# Patient Record
Sex: FEMALE | Race: OTHER | Employment: UNEMPLOYED | URBAN - METROPOLITAN AREA
[De-identification: names, ages, dates, MRNs, and addresses within clinical notes are randomized per-mention and may not be internally consistent; named-entity substitution may affect disease eponyms.]

---

## 2018-12-29 ENCOUNTER — HOSPITAL ENCOUNTER (EMERGENCY)
Age: 33
Discharge: HOME | End: 2018-12-29
Attending: EMERGENCY MEDICINE

## 2018-12-29 VITALS
HEART RATE: 80 BPM | HEIGHT: 61 IN | BODY MASS INDEX: 26.31 KG/M2 | DIASTOLIC BLOOD PRESSURE: 77 MMHG | TEMPERATURE: 98.6 F | SYSTOLIC BLOOD PRESSURE: 120 MMHG | WEIGHT: 139.33 LBS | OXYGEN SATURATION: 100 % | RESPIRATION RATE: 16 BRPM

## 2018-12-29 DIAGNOSIS — O21.9 NAUSEA AND VOMITING IN PREGNANCY: Primary | ICD-10-CM

## 2018-12-29 DIAGNOSIS — N30.90 CYSTITIS: ICD-10-CM

## 2018-12-29 LAB
ANION GAP SERPL CALCULATED.3IONS-SCNC: 11 MMOL/L (ref 3–16)
BACTERIA: ABNORMAL /HPF
BASOPHILS ABSOLUTE: 0 K/UL (ref 0–0.2)
BASOPHILS RELATIVE PERCENT: 0.4 %
BILIRUBIN URINE: NEGATIVE
BLOOD, URINE: ABNORMAL
BUN BLDV-MCNC: 10 MG/DL (ref 7–20)
CALCIUM SERPL-MCNC: 9.5 MG/DL (ref 8.3–10.6)
CHLORIDE BLD-SCNC: 102 MMOL/L (ref 99–110)
CLARITY: ABNORMAL
CO2: 23 MMOL/L (ref 21–32)
COLOR: YELLOW
CREAT SERPL-MCNC: <0.5 MG/DL (ref 0.6–1.1)
EOSINOPHILS ABSOLUTE: 0.1 K/UL (ref 0–0.6)
EOSINOPHILS RELATIVE PERCENT: 0.5 %
EPITHELIAL CELLS, UA: 3 /HPF (ref 0–5)
GFR AFRICAN AMERICAN: >60
GFR NON-AFRICAN AMERICAN: >60
GLUCOSE BLD-MCNC: 78 MG/DL (ref 70–99)
GLUCOSE URINE: NEGATIVE MG/DL
GONADOTROPIN, CHORIONIC (HCG) QUANT: NORMAL MIU/ML
HCT VFR BLD CALC: 42 % (ref 36–48)
HEMOGLOBIN: 14.2 G/DL (ref 12–16)
HYALINE CASTS: 3 /LPF (ref 0–8)
KETONES, URINE: NEGATIVE MG/DL
LEUKOCYTE ESTERASE, URINE: ABNORMAL
LYMPHOCYTES ABSOLUTE: 1.8 K/UL (ref 1–5.1)
LYMPHOCYTES RELATIVE PERCENT: 19.6 %
MCH RBC QN AUTO: 29.7 PG (ref 26–34)
MCHC RBC AUTO-ENTMCNC: 33.9 G/DL (ref 31–36)
MCV RBC AUTO: 87.6 FL (ref 80–100)
MICROSCOPIC EXAMINATION: YES
MONOCYTES ABSOLUTE: 0.7 K/UL (ref 0–1.3)
MONOCYTES RELATIVE PERCENT: 7.3 %
NEUTROPHILS ABSOLUTE: 6.8 K/UL (ref 1.7–7.7)
NEUTROPHILS RELATIVE PERCENT: 72.2 %
NITRITE, URINE: NEGATIVE
PDW BLD-RTO: 13.3 % (ref 12.4–15.4)
PH UA: 6.5
PLATELET # BLD: 277 K/UL (ref 135–450)
PMV BLD AUTO: 7 FL (ref 5–10.5)
POTASSIUM REFLEX MAGNESIUM: 4 MMOL/L (ref 3.5–5.1)
PROTEIN UA: NEGATIVE MG/DL
RBC # BLD: 4.79 M/UL (ref 4–5.2)
RBC UA: 8 /HPF (ref 0–4)
SODIUM BLD-SCNC: 136 MMOL/L (ref 136–145)
SPECIFIC GRAVITY UA: 1.02
URINE TYPE: ABNORMAL
UROBILINOGEN, URINE: 0.2 E.U./DL
WBC # BLD: 9.4 K/UL (ref 4–11)
WBC UA: 24 /HPF (ref 0–5)

## 2018-12-29 PROCEDURE — 87086 URINE CULTURE/COLONY COUNT: CPT

## 2018-12-29 PROCEDURE — 84702 CHORIONIC GONADOTROPIN TEST: CPT

## 2018-12-29 PROCEDURE — 81001 URINALYSIS AUTO W/SCOPE: CPT

## 2018-12-29 PROCEDURE — 85025 COMPLETE CBC W/AUTO DIFF WBC: CPT

## 2018-12-29 PROCEDURE — 99284 EMERGENCY DEPT VISIT MOD MDM: CPT

## 2018-12-29 PROCEDURE — 80048 BASIC METABOLIC PNL TOTAL CA: CPT

## 2018-12-29 RX ORDER — CEPHALEXIN 500 MG/1
500 CAPSULE ORAL 2 TIMES DAILY
Qty: 14 CAPSULE | Refills: 0 | Status: SHIPPED | OUTPATIENT
Start: 2018-12-29 | End: 2019-01-05

## 2018-12-29 RX ORDER — 0.9 % SODIUM CHLORIDE 0.9 %
1000 INTRAVENOUS SOLUTION INTRAVENOUS ONCE
Status: DISCONTINUED | OUTPATIENT
Start: 2018-12-29 | End: 2018-12-29 | Stop reason: HOSPADM

## 2018-12-29 RX ORDER — METOCLOPRAMIDE 10 MG/1
10 TABLET ORAL EVERY 6 HOURS PRN
Qty: 10 TABLET | Refills: 0 | Status: SHIPPED | OUTPATIENT
Start: 2018-12-29 | End: 2019-01-08

## 2018-12-29 RX ORDER — METOCLOPRAMIDE HYDROCHLORIDE 5 MG/ML
10 INJECTION INTRAMUSCULAR; INTRAVENOUS ONCE
Status: DISCONTINUED | OUTPATIENT
Start: 2018-12-29 | End: 2018-12-29 | Stop reason: HOSPADM

## 2018-12-29 ASSESSMENT — PAIN - FUNCTIONAL ASSESSMENT: PAIN_FUNCTIONAL_ASSESSMENT: 0-10

## 2018-12-29 ASSESSMENT — PAIN SCALES - GENERAL: PAINLEVEL_OUTOF10: 0

## 2018-12-30 ASSESSMENT — ENCOUNTER SYMPTOMS
NAUSEA: 1
ABDOMINAL PAIN: 0
VOMITING: 1
DIARRHEA: 0

## 2019-01-01 LAB — URINE CULTURE, ROUTINE: NORMAL

## 2019-01-08 ENCOUNTER — HOSPITAL ENCOUNTER (EMERGENCY)
Age: 34
Discharge: HOME | End: 2019-01-08

## 2019-01-08 VITALS
TEMPERATURE: 98.1 F | SYSTOLIC BLOOD PRESSURE: 116 MMHG | DIASTOLIC BLOOD PRESSURE: 68 MMHG | WEIGHT: 137.79 LBS | BODY MASS INDEX: 27.05 KG/M2 | HEART RATE: 74 BPM | RESPIRATION RATE: 14 BRPM | OXYGEN SATURATION: 100 % | HEIGHT: 60 IN

## 2019-01-08 DIAGNOSIS — O21.9 NAUSEA/VOMITING IN PREGNANCY: Primary | ICD-10-CM

## 2019-01-08 DIAGNOSIS — O23.41 URINARY TRACT INFECTION IN MOTHER DURING FIRST TRIMESTER OF PREGNANCY: ICD-10-CM

## 2019-01-08 LAB
A/G RATIO: 1.1 (ref 1.1–2.2)
ALBUMIN SERPL-MCNC: 4.1 G/DL (ref 3.4–5)
ALP BLD-CCNC: 55 U/L (ref 40–129)
ALT SERPL-CCNC: 16 U/L (ref 10–40)
ANION GAP SERPL CALCULATED.3IONS-SCNC: 15 MMOL/L (ref 3–16)
AST SERPL-CCNC: 20 U/L (ref 15–37)
BACTERIA: ABNORMAL /HPF
BASOPHILS ABSOLUTE: 0.1 K/UL (ref 0–0.2)
BASOPHILS RELATIVE PERCENT: 0.5 %
BILIRUB SERPL-MCNC: 0.6 MG/DL (ref 0–1)
BILIRUBIN URINE: NEGATIVE
BLOOD, URINE: ABNORMAL
BUN BLDV-MCNC: 7 MG/DL (ref 7–20)
CALCIUM SERPL-MCNC: 9.3 MG/DL (ref 8.3–10.6)
CHLORIDE BLD-SCNC: 100 MMOL/L (ref 99–110)
CLARITY: ABNORMAL
CO2: 21 MMOL/L (ref 21–32)
COLOR: YELLOW
CREAT SERPL-MCNC: <0.5 MG/DL (ref 0.6–1.1)
CRYSTALS, UA: ABNORMAL /HPF
EOSINOPHILS ABSOLUTE: 0.1 K/UL (ref 0–0.6)
EOSINOPHILS RELATIVE PERCENT: 1 %
EPITHELIAL CELLS, UA: 3 /HPF (ref 0–5)
GFR AFRICAN AMERICAN: >60
GFR NON-AFRICAN AMERICAN: >60
GLOBULIN: 3.8 G/DL
GLUCOSE BLD-MCNC: 79 MG/DL (ref 70–99)
GLUCOSE URINE: NEGATIVE MG/DL
GONADOTROPIN, CHORIONIC (HCG) QUANT: NORMAL MIU/ML
HCT VFR BLD CALC: 43.5 % (ref 36–48)
HEMOGLOBIN: 14.3 G/DL (ref 12–16)
HYALINE CASTS: 5 /LPF (ref 0–8)
KETONES, URINE: >=80 MG/DL
LEUKOCYTE ESTERASE, URINE: ABNORMAL
LYMPHOCYTES ABSOLUTE: 2.5 K/UL (ref 1–5.1)
LYMPHOCYTES RELATIVE PERCENT: 19.2 %
MCH RBC QN AUTO: 29 PG (ref 26–34)
MCHC RBC AUTO-ENTMCNC: 32.8 G/DL (ref 31–36)
MCV RBC AUTO: 88.2 FL (ref 80–100)
MICROSCOPIC EXAMINATION: YES
MONOCYTES ABSOLUTE: 0.8 K/UL (ref 0–1.3)
MONOCYTES RELATIVE PERCENT: 6.4 %
NEUTROPHILS ABSOLUTE: 9.4 K/UL (ref 1.7–7.7)
NEUTROPHILS RELATIVE PERCENT: 72.9 %
NITRITE, URINE: NEGATIVE
PDW BLD-RTO: 13.5 % (ref 12.4–15.4)
PH UA: 7
PLATELET # BLD: 308 K/UL (ref 135–450)
PLATELET SLIDE REVIEW: ADEQUATE
PMV BLD AUTO: 7.2 FL (ref 5–10.5)
POTASSIUM SERPL-SCNC: 4.2 MMOL/L (ref 3.5–5.1)
PROTEIN UA: NEGATIVE MG/DL
RBC # BLD: 4.93 M/UL (ref 4–5.2)
RBC UA: ABNORMAL /HPF (ref 0–2)
SLIDE REVIEW: ABNORMAL
SODIUM BLD-SCNC: 136 MMOL/L (ref 136–145)
SPECIFIC GRAVITY UA: 1.02
TOTAL PROTEIN: 7.9 G/DL (ref 6.4–8.2)
URINE REFLEX TO CULTURE: YES
URINE TYPE: ABNORMAL
UROBILINOGEN, URINE: 1 E.U./DL
WBC # BLD: 12.9 K/UL (ref 4–11)
WBC UA: 15 /HPF (ref 0–5)

## 2019-01-08 PROCEDURE — 85025 COMPLETE CBC W/AUTO DIFF WBC: CPT

## 2019-01-08 PROCEDURE — 84702 CHORIONIC GONADOTROPIN TEST: CPT

## 2019-01-08 PROCEDURE — 87086 URINE CULTURE/COLONY COUNT: CPT

## 2019-01-08 PROCEDURE — 6360000002 HC RX W HCPCS: Performed by: PHYSICIAN ASSISTANT

## 2019-01-08 PROCEDURE — 96374 THER/PROPH/DIAG INJ IV PUSH: CPT

## 2019-01-08 PROCEDURE — 96375 TX/PRO/DX INJ NEW DRUG ADDON: CPT

## 2019-01-08 PROCEDURE — 96361 HYDRATE IV INFUSION ADD-ON: CPT

## 2019-01-08 PROCEDURE — 80053 COMPREHEN METABOLIC PANEL: CPT

## 2019-01-08 PROCEDURE — 81001 URINALYSIS AUTO W/SCOPE: CPT

## 2019-01-08 PROCEDURE — 99283 EMERGENCY DEPT VISIT LOW MDM: CPT

## 2019-01-08 PROCEDURE — 2580000003 HC RX 258: Performed by: PHYSICIAN ASSISTANT

## 2019-01-08 RX ORDER — DIPHENHYDRAMINE HYDROCHLORIDE 50 MG/ML
12.5 INJECTION INTRAMUSCULAR; INTRAVENOUS ONCE
Status: COMPLETED | OUTPATIENT
Start: 2019-01-08 | End: 2019-01-08

## 2019-01-08 RX ORDER — METOCLOPRAMIDE 10 MG/1
10 TABLET ORAL 3 TIMES DAILY PRN
Qty: 20 TABLET | Refills: 0 | Status: SHIPPED | OUTPATIENT
Start: 2019-01-08 | End: 2019-03-22

## 2019-01-08 RX ORDER — 0.9 % SODIUM CHLORIDE 0.9 %
1000 INTRAVENOUS SOLUTION INTRAVENOUS ONCE
Status: COMPLETED | OUTPATIENT
Start: 2019-01-08 | End: 2019-01-08

## 2019-01-08 RX ORDER — NITROFURANTOIN 25; 75 MG/1; MG/1
100 CAPSULE ORAL 2 TIMES DAILY
Qty: 14 CAPSULE | Refills: 0 | Status: SHIPPED | OUTPATIENT
Start: 2019-01-08 | End: 2019-01-15

## 2019-01-08 RX ORDER — DEXTROSE AND SODIUM CHLORIDE 5; .45 G/100ML; G/100ML
INJECTION, SOLUTION INTRAVENOUS CONTINUOUS
Status: DISCONTINUED | OUTPATIENT
Start: 2019-01-08 | End: 2019-01-08 | Stop reason: HOSPADM

## 2019-01-08 RX ORDER — METOCLOPRAMIDE HYDROCHLORIDE 5 MG/ML
5 INJECTION INTRAMUSCULAR; INTRAVENOUS ONCE
Status: COMPLETED | OUTPATIENT
Start: 2019-01-08 | End: 2019-01-08

## 2019-01-08 RX ADMIN — METOCLOPRAMIDE 5 MG: 5 INJECTION, SOLUTION INTRAMUSCULAR; INTRAVENOUS at 19:13

## 2019-01-08 RX ADMIN — SODIUM CHLORIDE 1000 ML: 9 INJECTION, SOLUTION INTRAVENOUS at 19:13

## 2019-01-08 RX ADMIN — DIPHENHYDRAMINE HYDROCHLORIDE 12.5 MG: 50 INJECTION, SOLUTION INTRAMUSCULAR; INTRAVENOUS at 19:13

## 2019-01-08 RX ADMIN — DEXTROSE AND SODIUM CHLORIDE: 5; 450 INJECTION, SOLUTION INTRAVENOUS at 20:16

## 2019-01-08 ASSESSMENT — PAIN SCALES - GENERAL
PAINLEVEL_OUTOF10: 0

## 2019-01-09 ASSESSMENT — ENCOUNTER SYMPTOMS
NAUSEA: 1
BACK PAIN: 0
ABDOMINAL PAIN: 0
SHORTNESS OF BREATH: 0
VOMITING: 1

## 2019-01-10 LAB — URINE CULTURE, ROUTINE: NORMAL

## 2019-01-22 ENCOUNTER — HOSPITAL ENCOUNTER (OUTPATIENT)
Dept: OBGYN CLINIC | Age: 34
Discharge: HOME | End: 2019-01-22

## 2019-01-22 VITALS
WEIGHT: 141.2 LBS | HEART RATE: 73 BPM | SYSTOLIC BLOOD PRESSURE: 110 MMHG | DIASTOLIC BLOOD PRESSURE: 67 MMHG | BODY MASS INDEX: 27.58 KG/M2

## 2019-01-22 DIAGNOSIS — Z34.91 NORMAL PREGNANCY IN FIRST TRIMESTER: ICD-10-CM

## 2019-01-22 DIAGNOSIS — D21.9 FIBROID: Chronic | ICD-10-CM

## 2019-01-22 DIAGNOSIS — O21.9 NAUSEA AND VOMITING DURING PREGNANCY: ICD-10-CM

## 2019-01-22 LAB
ABO/RH: NORMAL
ANTIBODY SCREEN: NORMAL

## 2019-01-22 PROCEDURE — 86850 RBC ANTIBODY SCREEN: CPT

## 2019-01-22 PROCEDURE — 99213 OFFICE O/P EST LOW 20 MIN: CPT

## 2019-01-22 PROCEDURE — 86701 HIV-1ANTIBODY: CPT

## 2019-01-22 PROCEDURE — 87390 HIV-1 AG IA: CPT

## 2019-01-22 PROCEDURE — 99202 OFFICE O/P NEW SF 15 MIN: CPT | Performed by: OBSTETRICS & GYNECOLOGY

## 2019-01-22 PROCEDURE — 86762 RUBELLA ANTIBODY: CPT

## 2019-01-22 PROCEDURE — 90686 IIV4 VACC NO PRSV 0.5 ML IM: CPT | Performed by: OBSTETRICS & GYNECOLOGY

## 2019-01-22 PROCEDURE — 87491 CHLMYD TRACH DNA AMP PROBE: CPT

## 2019-01-22 PROCEDURE — 86901 BLOOD TYPING SEROLOGIC RH(D): CPT

## 2019-01-22 PROCEDURE — 86702 HIV-2 ANTIBODY: CPT

## 2019-01-22 PROCEDURE — 87086 URINE CULTURE/COLONY COUNT: CPT

## 2019-01-22 PROCEDURE — 80307 DRUG TEST PRSMV CHEM ANLYZR: CPT

## 2019-01-22 PROCEDURE — 6360000002 HC RX W HCPCS: Performed by: OBSTETRICS & GYNECOLOGY

## 2019-01-22 PROCEDURE — 87340 HEPATITIS B SURFACE AG IA: CPT

## 2019-01-22 PROCEDURE — 81003 URINALYSIS AUTO W/O SCOPE: CPT

## 2019-01-22 PROCEDURE — 87591 N.GONORRHOEAE DNA AMP PROB: CPT

## 2019-01-22 PROCEDURE — G0008 ADMIN INFLUENZA VIRUS VAC: HCPCS | Performed by: OBSTETRICS & GYNECOLOGY

## 2019-01-22 PROCEDURE — 36415 COLL VENOUS BLD VENIPUNCTURE: CPT

## 2019-01-22 PROCEDURE — 86780 TREPONEMA PALLIDUM: CPT

## 2019-01-22 PROCEDURE — 85025 COMPLETE CBC W/AUTO DIFF WBC: CPT

## 2019-01-22 PROCEDURE — 86900 BLOOD TYPING SEROLOGIC ABO: CPT

## 2019-01-22 RX ORDER — ONDANSETRON 4 MG/1
4 TABLET, FILM COATED ORAL EVERY 8 HOURS PRN
Qty: 30 TABLET | Refills: 0 | Status: SHIPPED | OUTPATIENT
Start: 2019-01-22 | End: 2019-02-05 | Stop reason: SDUPTHER

## 2019-01-22 RX ADMIN — INFLUENZA A VIRUS A/MICHIGAN/45/2015 X-275 (H1N1) ANTIGEN (FORMALDEHYDE INACTIVATED), INFLUENZA A VIRUS A/SINGAPORE/INFIMH-16-0019/2016 IVR-186 (H3N2) ANTIGEN (FORMALDEHYDE INACTIVATED), INFLUENZA B VIRUS B/PHUKET/3073/2013 ANTIGEN (FORMALDEHYDE INACTIVATED), AND INFLUENZA B VIRUS B/MARYLAND/15/2016 BX-69A ANTIGEN (FORMALDEHYDE INACTIVATED) 0.5 ML: 15; 15; 15; 15 INJECTION, SUSPENSION INTRAMUSCULAR at 15:35

## 2019-01-23 LAB
AMPHETAMINE SCREEN, URINE: NORMAL
BARBITURATE SCREEN URINE: NORMAL
BASOPHILS ABSOLUTE: 0 K/UL (ref 0–0.2)
BASOPHILS RELATIVE PERCENT: 0.4 %
BENZODIAZEPINE SCREEN, URINE: NORMAL
BILIRUBIN URINE: NEGATIVE MG/DL
BLOOD, URINE: ABNORMAL
BUPRENORPHINE URINE: NORMAL
CANNABINOID SCREEN URINE: NORMAL
CLARITY: CLEAR
COCAINE METABOLITE SCREEN URINE: NORMAL
COLOR: YELLOW
EOSINOPHILS ABSOLUTE: 0.1 K/UL (ref 0–0.6)
EOSINOPHILS RELATIVE PERCENT: 1 %
GLUCOSE URINE: NEGATIVE MG/DL
HCT VFR BLD CALC: 41.2 % (ref 36–48)
HEMOGLOBIN: 13.9 G/DL (ref 12–16)
HEPATITIS B SURFACE ANTIGEN INTERPRETATION: ABNORMAL
HIV AG/AB: NORMAL
HIV ANTIGEN: NORMAL
HIV-1 ANTIBODY: NORMAL
HIV-2 AB: NORMAL
KETONES, URINE: NEGATIVE MG/DL
LEUKOCYTE ESTERASE, URINE: ABNORMAL
LYMPHOCYTES ABSOLUTE: 2.2 K/UL (ref 1–5.1)
LYMPHOCYTES RELATIVE PERCENT: 18.3 %
Lab: NORMAL
MCH RBC QN AUTO: 29.7 PG (ref 26–34)
MCHC RBC AUTO-ENTMCNC: 33.7 G/DL (ref 31–36)
MCV RBC AUTO: 88.1 FL (ref 80–100)
METHADONE SCREEN, URINE: NORMAL
MONOCYTES ABSOLUTE: 0.9 K/UL (ref 0–1.3)
MONOCYTES RELATIVE PERCENT: 7.4 %
NEUTROPHILS ABSOLUTE: 8.8 K/UL (ref 1.7–7.7)
NEUTROPHILS RELATIVE PERCENT: 72.9 %
NITRITE, URINE: NEGATIVE
OPIATE SCREEN URINE: NORMAL
OXYCODONE URINE: NORMAL
PDW BLD-RTO: 13.3 % (ref 12.4–15.4)
PH UA: 6
PH UA: 7
PHENCYCLIDINE SCREEN URINE: NORMAL
PLATELET # BLD: 304 K/UL (ref 135–450)
PMV BLD AUTO: 7 FL (ref 5–10.5)
PROPOXYPHENE SCREEN: NORMAL
PROTEIN UA: NEGATIVE MG/DL
RBC # BLD: 4.68 M/UL (ref 4–5.2)
RUBELLA ANTIBODY IGG: 249.8 IU/ML
SPECIFIC GRAVITY UA: 1.02
TOTAL SYPHILLIS IGG/IGM: ABNORMAL
UROBILINOGEN, URINE: 0.2 E.U./DL
WBC # BLD: 12.1 K/UL (ref 4–11)

## 2019-01-24 LAB
C. TRACHOMATIS DNA ,URINE: NEGATIVE
N. GONORRHOEAE DNA, URINE: NEGATIVE
URINE CULTURE, ROUTINE: NORMAL

## 2019-02-05 ENCOUNTER — TELEPHONE (OUTPATIENT)
Dept: OBGYN CLINIC | Age: 34
End: 2019-02-05

## 2019-02-05 RX ORDER — ONDANSETRON 4 MG/1
4 TABLET, FILM COATED ORAL EVERY 8 HOURS PRN
Qty: 30 TABLET | Refills: 0 | Status: SHIPPED | OUTPATIENT
Start: 2019-02-05 | End: 2019-03-14 | Stop reason: SDUPTHER

## 2019-02-22 ENCOUNTER — HOSPITAL ENCOUNTER (OUTPATIENT)
Dept: OBGYN CLINIC | Age: 34
Discharge: HOME | End: 2019-02-22

## 2019-02-22 VITALS
DIASTOLIC BLOOD PRESSURE: 67 MMHG | BODY MASS INDEX: 28.36 KG/M2 | HEART RATE: 73 BPM | WEIGHT: 145.2 LBS | SYSTOLIC BLOOD PRESSURE: 113 MMHG

## 2019-02-22 DIAGNOSIS — Z34.91 NORMAL PREGNANCY IN FIRST TRIMESTER: Primary | ICD-10-CM

## 2019-02-22 LAB
BILIRUBIN URINE: NEGATIVE MG/DL
BLOOD, URINE: ABNORMAL
CLARITY: CLEAR
COLOR: YELLOW
GLUCOSE URINE: NEGATIVE MG/DL
KETONES, URINE: NEGATIVE MG/DL
LEUKOCYTE ESTERASE, URINE: ABNORMAL
NITRITE, URINE: NEGATIVE
PH UA: 7.5
PROTEIN UA: NEGATIVE MG/DL
SPECIFIC GRAVITY UA: 1.01
UROBILINOGEN, URINE: 0.2 E.U./DL

## 2019-02-22 PROCEDURE — 99212 OFFICE O/P EST SF 10 MIN: CPT

## 2019-02-22 PROCEDURE — 87491 CHLMYD TRACH DNA AMP PROBE: CPT

## 2019-02-22 PROCEDURE — 81003 URINALYSIS AUTO W/O SCOPE: CPT

## 2019-02-22 PROCEDURE — 99212 OFFICE O/P EST SF 10 MIN: CPT | Performed by: ADVANCED PRACTICE MIDWIFE

## 2019-02-22 PROCEDURE — 88175 CYTOPATH C/V AUTO FLUID REDO: CPT

## 2019-02-22 PROCEDURE — 87591 N.GONORRHOEAE DNA AMP PROB: CPT

## 2019-02-22 PROCEDURE — 87624 HPV HI-RISK TYP POOLED RSLT: CPT

## 2019-02-26 LAB
HPV COMMENT: NORMAL
HPV TYPE 16: NOT DETECTED
HPV TYPE 18: NOT DETECTED
HPVOH (OTHER TYPES): NOT DETECTED

## 2019-02-28 LAB
C. TRACHOMATIS DNA,THIN PREP: NEGATIVE
N. GONORRHOEAE DNA, THIN PREP: NEGATIVE

## 2019-03-14 ENCOUNTER — TELEPHONE (OUTPATIENT)
Dept: OBGYN CLINIC | Age: 34
End: 2019-03-14

## 2019-03-14 RX ORDER — ONDANSETRON 4 MG/1
4 TABLET, FILM COATED ORAL EVERY 8 HOURS PRN
Qty: 10 TABLET | Refills: 0 | Status: ON HOLD | OUTPATIENT
Start: 2019-03-14 | End: 2019-08-01 | Stop reason: HOSPADM

## 2019-03-22 ENCOUNTER — HOSPITAL ENCOUNTER (OUTPATIENT)
Dept: OBGYN CLINIC | Age: 34
Discharge: HOME | End: 2019-03-22

## 2019-03-22 VITALS
SYSTOLIC BLOOD PRESSURE: 115 MMHG | DIASTOLIC BLOOD PRESSURE: 69 MMHG | HEART RATE: 79 BPM | BODY MASS INDEX: 30.27 KG/M2 | WEIGHT: 155 LBS

## 2019-03-22 DIAGNOSIS — Z34.91 NORMAL PREGNANCY IN FIRST TRIMESTER: ICD-10-CM

## 2019-03-22 LAB
BILIRUBIN URINE: NEGATIVE MG/DL
BLOOD, URINE: ABNORMAL
CLARITY: ABNORMAL
COLOR: YELLOW
GLUCOSE URINE: NEGATIVE MG/DL
KETONES, URINE: NEGATIVE MG/DL
LEUKOCYTE ESTERASE, URINE: ABNORMAL
NITRITE, URINE: NEGATIVE
PH UA: 8.5 (ref 5–8)
PROTEIN UA: NEGATIVE MG/DL
SPECIFIC GRAVITY UA: 1.01 (ref 1–1.03)
UROBILINOGEN, URINE: 0.2 E.U./DL

## 2019-03-22 PROCEDURE — 99212 OFFICE O/P EST SF 10 MIN: CPT

## 2019-03-22 PROCEDURE — 87086 URINE CULTURE/COLONY COUNT: CPT

## 2019-03-22 PROCEDURE — 99212 OFFICE O/P EST SF 10 MIN: CPT | Performed by: ADVANCED PRACTICE MIDWIFE

## 2019-03-22 PROCEDURE — 81003 URINALYSIS AUTO W/O SCOPE: CPT

## 2019-03-22 RX ORDER — ONDANSETRON 4 MG/1
4 TABLET, ORALLY DISINTEGRATING ORAL EVERY 8 HOURS PRN
Qty: 30 TABLET | Refills: 0 | Status: ON HOLD | OUTPATIENT
Start: 2019-03-22 | End: 2019-08-01 | Stop reason: HOSPADM

## 2019-03-22 RX ORDER — NITROFURANTOIN 25; 75 MG/1; MG/1
100 CAPSULE ORAL 2 TIMES DAILY
Qty: 14 CAPSULE | Refills: 0 | Status: SHIPPED | OUTPATIENT
Start: 2019-03-22 | End: 2019-03-29

## 2019-03-24 LAB — URINE CULTURE, ROUTINE: NORMAL

## 2019-04-26 ENCOUNTER — HOSPITAL ENCOUNTER (OUTPATIENT)
Dept: OBGYN CLINIC | Age: 34
Discharge: HOME | End: 2019-04-26

## 2019-04-26 ENCOUNTER — HOSPITAL ENCOUNTER (OUTPATIENT)
Dept: ULTRASOUND IMAGING | Age: 34
Discharge: HOME | End: 2019-04-26

## 2019-04-26 VITALS
SYSTOLIC BLOOD PRESSURE: 114 MMHG | WEIGHT: 161.25 LBS | DIASTOLIC BLOOD PRESSURE: 76 MMHG | HEART RATE: 106 BPM | BODY MASS INDEX: 31.49 KG/M2

## 2019-04-26 DIAGNOSIS — Z34.91 NORMAL PREGNANCY IN FIRST TRIMESTER: Primary | ICD-10-CM

## 2019-04-26 DIAGNOSIS — O99.012 ANEMIA AFFECTING PREGNANCY IN SECOND TRIMESTER: ICD-10-CM

## 2019-04-26 DIAGNOSIS — D21.9 FIBROID: Chronic | ICD-10-CM

## 2019-04-26 DIAGNOSIS — Z34.91 NORMAL PREGNANCY IN FIRST TRIMESTER: ICD-10-CM

## 2019-04-26 PROBLEM — O21.9 NAUSEA AND VOMITING DURING PREGNANCY: Status: RESOLVED | Noted: 2019-01-22 | Resolved: 2019-04-26

## 2019-04-26 LAB
ABO/RH: NORMAL
ANTIBODY SCREEN: NORMAL
BILIRUBIN URINE: NEGATIVE MG/DL
BLOOD, URINE: ABNORMAL
CLARITY: ABNORMAL
COLOR: YELLOW
GLUCOSE CHALLENGE: 122 MG/DL
GLUCOSE URINE: NEGATIVE MG/DL
HCT VFR BLD CALC: 32 % (ref 36–48)
HEMOGLOBIN: 10.8 G/DL (ref 12–16)
KETONES, URINE: NEGATIVE MG/DL
LEUKOCYTE ESTERASE, URINE: ABNORMAL
MCH RBC QN AUTO: 29.2 PG (ref 26–34)
MCHC RBC AUTO-ENTMCNC: 33.7 G/DL (ref 31–36)
MCV RBC AUTO: 86.6 FL (ref 80–100)
NITRITE, URINE: NEGATIVE
PDW BLD-RTO: 13.3 % (ref 12.4–15.4)
PH UA: 7.5 (ref 5–8)
PLATELET # BLD: 294 K/UL (ref 135–450)
PMV BLD AUTO: 6.4 FL (ref 5–10.5)
PROTEIN UA: NEGATIVE MG/DL
RBC # BLD: 3.7 M/UL (ref 4–5.2)
SPECIFIC GRAVITY UA: 1.01 (ref 1–1.03)
TOTAL SYPHILLIS IGG/IGM: NORMAL
UROBILINOGEN, URINE: 0.2 E.U./DL
WBC # BLD: 10.5 K/UL (ref 4–11)

## 2019-04-26 PROCEDURE — 86900 BLOOD TYPING SEROLOGIC ABO: CPT

## 2019-04-26 PROCEDURE — 85027 COMPLETE CBC AUTOMATED: CPT

## 2019-04-26 PROCEDURE — 81003 URINALYSIS AUTO W/O SCOPE: CPT

## 2019-04-26 PROCEDURE — 86901 BLOOD TYPING SEROLOGIC RH(D): CPT

## 2019-04-26 PROCEDURE — 99213 OFFICE O/P EST LOW 20 MIN: CPT | Performed by: OBSTETRICS & GYNECOLOGY

## 2019-04-26 PROCEDURE — 86850 RBC ANTIBODY SCREEN: CPT

## 2019-04-26 PROCEDURE — 76805 OB US >/= 14 WKS SNGL FETUS: CPT

## 2019-04-26 PROCEDURE — 86780 TREPONEMA PALLIDUM: CPT

## 2019-04-26 PROCEDURE — 99212 OFFICE O/P EST SF 10 MIN: CPT

## 2019-04-26 PROCEDURE — 82950 GLUCOSE TEST: CPT

## 2019-04-26 PROCEDURE — 36415 COLL VENOUS BLD VENIPUNCTURE: CPT

## 2019-04-26 NOTE — PLAN OF CARE
Prenatal Clinic  Clinical Level of [address].    NAME: Jamia Raymond  YOB: 1985 GENDER: female  MEDICAL RECORD NUMBER:  [id number]  DATE:  4/26/2019    Assessment   Points   No Assessment []   0   General Prenatal assessment (e.g. weight, vital signs, urine test). Completed OB Clinic navigator tabs, in partnership with the Registered Nurse. []   1   General Prenatal assessment (e.g. weight, vital signs, urine test). Completed OB Clinic navigator tabs, in partnership with the Registered Nurse. Set up tests or procedures (e.g. ultrasound, specimen swabs, induction). [x]   2   Full Prenatal assessment (e.g. weight, vitals signs, urine test) to include a full review of history. Completed OB Clinic navigator tabs, in partnership with the Registered Nurse. Set up tests or procedures (e.g. ultrasound, specimen swabs, induction). Or transfer to an outside facility, transfer to [facility] for further evaluation, admission to the hospital.  []   3       Ambulation Status   Status Definition Points   Independent Independently able to ambulate. Fully able (without any assistance) to get on/off exam table/chair. [x]   0   Minimal Physical Assistance Requires physical assistance of one person to ambulate and/or position patient to be examined. Includes necessary physical assistance to position lower extremities on/off stool. []   1   Moderate Physical Assistance Requires at least one staff member to physically assist patient in ambulating into treatment room, and on/off recliner chair. []   2   Full Assistance Requires assistance of at least two staff members to transfer patient into treatment room and/or on/off exam table/chair. \"Total Transfer\". []   3       Teaching Effort   Effort Definition Points   No Teaching  []   0   General The teaching will focus on visit schedule and laboratory tests. This education can be found in the Discharge Instructions.  []   1 Intermediate The teaching will focus on visit schedule and laboratory tests, plus additional topics such as health and lifestyle (e.g. kick counts, diet). This education can be found in the Discharge Instructions [x]   2   Complex New patient information packet given to the patient/caregiver and reviewed with the Registered Nurse.   []   3       Patient Discharge and Planning  Planning Definition Points   General Follow-up with routine assessment and planning. Discharge instructions and After Visit Summary given to patient/caregiver and reviewed with the Registered Nurse. Simple follow-up with routine assessment and planning.    []   1   Intermediate Follow-up with routine assessment and planning. Discharge instructions and After Visit Summary given to patient/caregiver and reviewed with the Registered Nurse. Contact with additional resources (e.g. , Physician, Lactation). May include filling out forms, writing letters, communication with insurance , FLMA forms, etc.   [x]   2   Complex Full, comprehensive assessment and planning which includes assistance for a hospital admission or transfer to a higher level of care facility.   []   3     Is this the Patient's First Visit to the Prenatal Clinic    No     Is this Patient Established @ this [facility] AT [place]  Yes             Clinical Level of Care      Points  0-3  Level 1 []     Points  4-6  Level 2 [x]     Points  7-8  Level 3 []     Points  9-10  Level 4 []     Points  11-12  Level 5 []       Electronically signed by Balbir Santiago RN on 4/26/2019 at 10:34 AM

## 2019-04-26 NOTE — PROGRESS NOTES
Pt states baby is active every day. Denies any LOF or VB. Pt to have GCT, CBC, RPR and T&S today.   Has US at
Prenatal Multivit-Min-Fe-FA (PRENATAL VITAMINS) 0.8 MG TABS One qd 16   Lamine John MD       REVIEW OF SYSTEMS    Pertinent items are noted in HPI. Objective:      /76   Pulse 106   Wt 161 lb 4 oz (73.1 kg)   LMP 10/31/2018   BMI 31.49 kg/m²     Wt Readings from Last 2 Encounters:   19 161 lb 4 oz (73.1 kg)   19 155 lb (70.3 kg)       See ACOG flowsheet above    Assessment:     Patient Active Problem List   Diagnosis Code    Fibroid D21.9    34 yo Trenda Able  Z34.91       35 y.o.  25w2d    -labs and US today  -has 5-6 cm uterine fibroid, but had  with G1, cont monitoring    Obstetrician: Gt Gloria MD      Plan:     - Prenatal labs: Reviewed by the physician  - Patient to follow up in: 4 weeks    Kick count reinforced. Pregnancy expectations were discussed and all questions were answered. 70 % of time was spent discussing findings, management, and treatment options.         Electronically signed by Gt Gloria MD on 2019 at 9:46 AM

## 2019-04-29 PROBLEM — O99.012 ANEMIA AFFECTING PREGNANCY IN SECOND TRIMESTER: Status: ACTIVE | Noted: 2019-04-29

## 2019-05-01 RX ORDER — FERROUS SULFATE 325(65) MG
325 TABLET ORAL
COMMUNITY

## 2019-05-24 ENCOUNTER — HOSPITAL ENCOUNTER (OUTPATIENT)
Dept: OBGYN CLINIC | Age: 34
Discharge: HOME | End: 2019-05-24

## 2019-05-24 VITALS
SYSTOLIC BLOOD PRESSURE: 109 MMHG | WEIGHT: 165.13 LBS | HEART RATE: 84 BPM | BODY MASS INDEX: 32.25 KG/M2 | DIASTOLIC BLOOD PRESSURE: 69 MMHG

## 2019-05-24 DIAGNOSIS — Z34.91 NORMAL PREGNANCY IN FIRST TRIMESTER: ICD-10-CM

## 2019-05-24 LAB
BILIRUBIN URINE: NEGATIVE MG/DL
BLOOD, URINE: ABNORMAL
CLARITY: ABNORMAL
COLOR: YELLOW
GLUCOSE URINE: NEGATIVE MG/DL
KETONES, URINE: NEGATIVE MG/DL
LEUKOCYTE ESTERASE, URINE: ABNORMAL
NITRITE, URINE: NEGATIVE
PH UA: 8.5 (ref 5–8)
PROTEIN UA: ABNORMAL MG/DL
SPECIFIC GRAVITY UA: 1.01 (ref 1–1.03)
UROBILINOGEN, URINE: 0.2 E.U./DL

## 2019-05-24 PROCEDURE — 81003 URINALYSIS AUTO W/O SCOPE: CPT

## 2019-05-24 PROCEDURE — 90471 IMMUNIZATION ADMIN: CPT | Performed by: OBSTETRICS & GYNECOLOGY

## 2019-05-24 PROCEDURE — 99212 OFFICE O/P EST SF 10 MIN: CPT

## 2019-05-24 PROCEDURE — 99213 OFFICE O/P EST LOW 20 MIN: CPT | Performed by: OBSTETRICS & GYNECOLOGY

## 2019-05-24 PROCEDURE — 90715 TDAP VACCINE 7 YRS/> IM: CPT | Performed by: OBSTETRICS & GYNECOLOGY

## 2019-05-24 PROCEDURE — 6360000002 HC RX W HCPCS: Performed by: OBSTETRICS & GYNECOLOGY

## 2019-05-24 RX ADMIN — TETANUS TOXOID, REDUCED DIPHTHERIA TOXOID AND ACELLULAR PERTUSSIS VACCINE, ADSORBED 0.5 ML: 5; 2.5; 8; 8; 2.5 SUSPENSION INTRAMUSCULAR at 12:03

## 2019-05-24 NOTE — PLAN OF CARE
Intermediate The teaching will focus on visit schedule and laboratory tests, plus additional topics such as health and lifestyle (e.g. kick counts, diet). This education can be found in the Discharge Instructions []   2   Complex New patient information packet given to the patient/caregiver and reviewed with the Registered Nurse.   []   3       Patient Discharge and Planning  Planning Definition Points   General Follow-up with routine assessment and planning. Discharge instructions and After Visit Summary given to patient/caregiver and reviewed with the Registered Nurse. Simple follow-up with routine assessment and planning.    []   1   Intermediate Follow-up with routine assessment and planning. Discharge instructions and After Visit Summary given to patient/caregiver and reviewed with the Registered Nurse. Contact with additional resources (e.g. , Physician, Lactation). May include filling out forms, writing letters, communication with insurance , FLMA forms, etc.   [x]   2   Complex Full, comprehensive assessment and planning which includes assistance for a hospital admission or transfer to a higher level of care facility.   []   3     Is this the Patient's First Visit to the Prenatal Clinic    No     Is this Patient Established @ this [facility] AT [place]  Yes             Clinical Level of Care      Points  0-3  Level 1 []     Points  4-6  Level 2 [x]     Points  7-8  Level 3 []     Points  9-10  Level 4 []     Points  11-12  Level 5 []       Electronically signed by Law Blue RN on 5/24/2019 at 12:11 PM

## 2019-05-24 NOTE — PROGRESS NOTES
Prenatal [address] [facility]  [address], [address], [facility] 24      OB Follow-Up Visit Progress Note    Chief Complaint   Patient presents with    Routine Prenatal Visit        Subjective:     HISTORY OF PRESENT ILLNESS (HPI)    History of  Chelsey Landaverde is a 35 y.o.,  at 29w2d who presents to the clinic for her follow-up OB visit. Leticia Reeves  denies any complaints at this time. She does not complain of vaginal bleeding. She does not report leakage of fluid. She  does not complain of contractions. She  does not complain of decreased fetal movement. PAST MEDICAL HISTORY        Diagnosis Date    Anemia affecting pregnancy in second trimester 2019    Breast disorder 2015    breast biopsy right        PAST SURGICAL HISTORY    No past surgical history on file. FAMILY HISTORY    No family history on file. SOCIAL HISTORY    Social History     Tobacco Use    Smoking status: Never Smoker    Smokeless tobacco: Never Used   Substance Use Topics    Alcohol use: No    Drug use: No       OB HISTORY    OB History    Para Term  AB Living   2 1 1 0 0 1   SAB TAB Ectopic Molar Multiple Live Births   0 0 0 0 0 1      # Outcome Date GA Lbr Amor/2nd Weight Sex Delivery Anes PTL Lv   2 Current            1 Term 16 37w6d   F Vag-Spont EPI N NATHAN      Apgar1: 9  Apgar5: 9       ALLERGIES    No Known Allergies    MEDICATIONS    Prior to Admission medications    Medication Sig Start Date End Date Taking?  Authorizing Provider   ferrous sulfate 325 (65 Fe) MG tablet Take 325 mg by mouth daily (with breakfast)   Yes Historical Provider, MD   ondansetron (ZOFRAN ODT) 4 MG disintegrating tablet Take 1 tablet by mouth every 8 hours as needed for Nausea or Vomiting 3/22/19  Yes YANIRA Hobbs CNM   Multiple Vitamins-Minerals (MULTIVITAL PO) Take by mouth   Yes Historical Provider, MD   ondansetron (ZOFRAN) 4 MG tablet Take 1 tablet by

## 2019-06-13 ENCOUNTER — HOSPITAL ENCOUNTER (OUTPATIENT)
Dept: OBGYN CLINIC | Age: 34
Discharge: HOME | End: 2019-06-13

## 2019-06-13 VITALS
WEIGHT: 167.5 LBS | HEART RATE: 81 BPM | SYSTOLIC BLOOD PRESSURE: 110 MMHG | BODY MASS INDEX: 32.71 KG/M2 | DIASTOLIC BLOOD PRESSURE: 72 MMHG

## 2019-06-13 LAB
BILIRUBIN URINE: NEGATIVE MG/DL
BLOOD, URINE: ABNORMAL
CLARITY: CLEAR
COLOR: YELLOW
GLUCOSE URINE: NEGATIVE MG/DL
KETONES, URINE: NEGATIVE MG/DL
LEUKOCYTE ESTERASE, URINE: ABNORMAL
NITRITE, URINE: NEGATIVE
PH UA: 8.5 (ref 5–8)
PROTEIN UA: NEGATIVE MG/DL
SPECIFIC GRAVITY UA: 1.01 (ref 1–1.03)
UROBILINOGEN, URINE: 0.2 E.U./DL

## 2019-06-13 PROCEDURE — 99212 OFFICE O/P EST SF 10 MIN: CPT

## 2019-06-13 PROCEDURE — 81003 URINALYSIS AUTO W/O SCOPE: CPT

## 2019-06-13 PROCEDURE — 99213 OFFICE O/P EST LOW 20 MIN: CPT | Performed by: OBSTETRICS & GYNECOLOGY

## 2019-06-13 NOTE — PROGRESS NOTES
Prenatal [facility]  [address] [facility], [address] [place] [address] 24      OB Follow-Up Visit Progress Note    Chief Complaint   Patient presents with    Routine Prenatal Visit        Subjective:     HISTORY OF PRESENT ILLNESS (HPI)    History of  Isa Marcano is a 29 y.o.,  at 32w1d who presents to the clinic for her follow-up OB visit. Susana Welch  denies any complaints at this time. She does not complain of vaginal bleeding. She does not report leakage of fluid. She  does not complain of contractions. She  does not complain of decreased fetal movement. PAST MEDICAL HISTORY        Diagnosis Date    Anemia affecting pregnancy in second trimester 2019    Breast disorder 2015    breast biopsy right        PAST SURGICAL HISTORY    No past surgical history on file. FAMILY HISTORY    No family history on file. SOCIAL HISTORY    Social History     Tobacco Use    Smoking status: Never Smoker    Smokeless tobacco: Never Used   Substance Use Topics    Alcohol use: No    Drug use: No       OB HISTORY    OB History    Para Term  AB Living   2 1 1 0 0 1   SAB TAB Ectopic Molar Multiple Live Births   0 0 0 0 0 1      # Outcome Date GA Lbr Amor/2nd Weight Sex Delivery Anes PTL Lv   2 Current            1 Term 16 37w6d   F Vag-Spont EPI N NATHAN      Apgar1: 9  Apgar5: 9       ALLERGIES    No Known Allergies    MEDICATIONS    Prior to Admission medications    Medication Sig Start Date End Date Taking?  Authorizing Provider   ferrous sulfate 325 (65 Fe) MG tablet Take 325 mg by mouth daily (with breakfast)   Yes Historical Provider, MD   ondansetron (ZOFRAN ODT) 4 MG disintegrating tablet Take 1 tablet by mouth every 8 hours as needed for Nausea or Vomiting 3/22/19  Yes YANIRA Herman CNM   Multiple Vitamins-Minerals (MULTIVITAL PO) Take by mouth   Yes Historical Provider, MD   ondansetron (ZOFRAN) 4 MG tablet Take 1 tablet by

## 2019-06-13 NOTE — PLAN OF CARE
Prenatal Clinic  Clinical Level of [address].    NAME: Brandy Duverney  YOB: 1985 GENDER: female  MEDICAL RECORD NUMBER:  [id number]  DATE:  6/13/2019    Assessment   Points   No Assessment []   0   General Prenatal assessment (e.g. weight, vital signs, urine test). Completed OB Clinic navigator tabs, in partnership with the Registered Nurse. [x]   1   General Prenatal assessment (e.g. weight, vital signs, urine test). Completed OB Clinic navigator tabs, in partnership with the Registered Nurse. Set up tests or procedures (e.g. ultrasound, specimen swabs, induction). []   2   Full Prenatal assessment (e.g. weight, vitals signs, urine test) to include a full review of history. Completed OB Clinic navigator tabs, in partnership with the Registered Nurse. Set up tests or procedures (e.g. ultrasound, specimen swabs, induction). Or transfer to an outside facility, transfer to [facility] for further evaluation, admission to the hospital.  []   3       Ambulation Status   Status Definition Points   Independent Independently able to ambulate. Fully able (without any assistance) to get on/off exam table/chair. [x]   0   Minimal Physical Assistance Requires physical assistance of one person to ambulate and/or position patient to be examined. Includes necessary physical assistance to position lower extremities on/off stool. []   1   Moderate Physical Assistance Requires at least one staff member to physically assist patient in ambulating into treatment room, and on/off recliner chair. []   2   Full Assistance Requires assistance of at least two staff members to transfer patient into treatment room and/or on/off exam table/chair. \"Total Transfer\". []   3       Teaching Effort   Effort Definition Points   No Teaching  []   0   General The teaching will focus on visit schedule and laboratory tests. This education can be found in the Discharge Instructions.  []   1 Intermediate The teaching will focus on visit schedule and laboratory tests, plus additional topics such as health and lifestyle (e.g. kick counts, diet). This education can be found in the Discharge Instructions [x]   2   Complex New patient information packet given to the patient/caregiver and reviewed with the Registered Nurse.   []   3       Patient Discharge and Planning  Planning Definition Points   General Follow-up with routine assessment and planning. Discharge instructions and After Visit Summary given to patient/caregiver and reviewed with the Registered Nurse. Simple follow-up with routine assessment and planning.    []   1   Intermediate Follow-up with routine assessment and planning. Discharge instructions and After Visit Summary given to patient/caregiver and reviewed with the Registered Nurse. Contact with additional resources (e.g. , Physician, Lactation). May include filling out forms, writing letters, communication with insurance , FLMA forms, etc.   [x]   2   Complex Full, comprehensive assessment and planning which includes assistance for a hospital admission or transfer to a higher level of care facility.   []   3     Is this the Patient's First Visit to the Prenatal Clinic    No     Is this Patient Established @ this [facility] AT [place]  Yes             Clinical Level of Care      Points  0-3  Level 1 []     Points  4-6  Level 2 [x]     Points  7-8  Level 3 []     Points  9-10  Level 4 []     Points  11-12  Level 5 []       Electronically signed by Yosi Ferrari RN on 6/13/2019 at 12:03 PM

## 2019-06-13 NOTE — PROGRESS NOTES
Pt has no complaints other than being tired. Denies any LOF or VB. Baby moves a lot per pt. Denies any regular contractions.

## 2019-06-20 ENCOUNTER — TELEPHONE (OUTPATIENT)
Dept: OBGYN CLINIC | Age: 34
End: 2019-06-20

## 2019-06-27 ENCOUNTER — HOSPITAL ENCOUNTER (OUTPATIENT)
Dept: OBGYN CLINIC | Age: 34
Discharge: HOME | End: 2019-06-27

## 2019-06-27 VITALS
BODY MASS INDEX: 33.05 KG/M2 | SYSTOLIC BLOOD PRESSURE: 111 MMHG | WEIGHT: 169.25 LBS | DIASTOLIC BLOOD PRESSURE: 71 MMHG | HEART RATE: 88 BPM

## 2019-06-27 DIAGNOSIS — D21.9 FIBROID: Chronic | ICD-10-CM

## 2019-06-27 DIAGNOSIS — Z34.91 NORMAL PREGNANCY IN FIRST TRIMESTER: Primary | ICD-10-CM

## 2019-06-27 PROCEDURE — 99211 OFF/OP EST MAY X REQ PHY/QHP: CPT

## 2019-06-27 PROCEDURE — 99212 OFFICE O/P EST SF 10 MIN: CPT

## 2019-06-27 PROCEDURE — 81003 URINALYSIS AUTO W/O SCOPE: CPT

## 2019-06-27 PROCEDURE — 99213 OFFICE O/P EST LOW 20 MIN: CPT | Performed by: OBSTETRICS & GYNECOLOGY

## 2019-06-27 PROCEDURE — 87086 URINE CULTURE/COLONY COUNT: CPT

## 2019-06-27 NOTE — FLOWSHEET NOTE
Patient here for scheduled prenatal clinic. Chart reviewed with patient. Discussed that her N/V has lessened but is bothered by indigestion at times in the evening.

## 2019-06-27 NOTE — PLAN OF CARE
Prenatal Clinic  Clinical Level of [address].    NAME: Carmian Rooney  YOB: 1985 GENDER: female  MEDICAL RECORD NUMBER:  [id number]  DATE:  6/27/2019    Assessment   Points   No Assessment []   0   General Prenatal assessment (e.g. weight, vital signs, urine test). Completed OB Clinic navigator tabs, in partnership with the Registered Nurse. []   1   General Prenatal assessment (e.g. weight, vital signs, urine test). Completed OB Clinic navigator tabs, in partnership with the Registered Nurse. Set up tests or procedures (e.g. ultrasound, specimen swabs, induction). [x]   2   Full Prenatal assessment (e.g. weight, vitals signs, urine test) to include a full review of history. Completed OB Clinic navigator tabs, in partnership with the Registered Nurse. Set up tests or procedures (e.g. ultrasound, specimen swabs, induction). Or transfer to an outside facility, transfer to [facility] for further evaluation, admission to the hospital.  []   3       Ambulation Status   Status Definition Points   Independent Independently able to ambulate. Fully able (without any assistance) to get on/off exam table/chair. [x]   0   Minimal Physical Assistance Requires physical assistance of one person to ambulate and/or position patient to be examined. Includes necessary physical assistance to position lower extremities on/off stool. []   1   Moderate Physical Assistance Requires at least one staff member to physically assist patient in ambulating into treatment room, and on/off recliner chair. []   2   Full Assistance Requires assistance of at least two staff members to transfer patient into treatment room and/or on/off exam table/chair. \"Total Transfer\". []   3       Teaching Effort   Effort Definition Points   No Teaching  []   0   General The teaching will focus on visit schedule and laboratory tests. This education can be found in the Discharge Instructions.  [x]   1 Intermediate The teaching will focus on visit schedule and laboratory tests, plus additional topics such as health and lifestyle (e.g. kick counts, diet). This education can be found in the Discharge Instructions []   2   Complex New patient information packet given to the patient/caregiver and reviewed with the Registered Nurse.   []   3       Patient Discharge and Planning  Planning Definition Points   General Follow-up with routine assessment and planning. Discharge instructions and After Visit Summary given to patient/caregiver and reviewed with the Registered Nurse. Simple follow-up with routine assessment and planning. [x]   1   Intermediate Follow-up with routine assessment and planning. Discharge instructions and After Visit Summary given to patient/caregiver and reviewed with the Registered Nurse. Contact with additional resources (e.g. , Physician, Lactation). May include filling out forms, writing letters, communication with insurance , FLMA forms, etc.   []   2   Complex Full, comprehensive assessment and planning which includes assistance for a hospital admission or transfer to a higher level of care facility.   []   3     Is this the Patient's First Visit to the Prenatal Clinic    No     Is this Patient Established @ this [facility] [place] [facility] [place]  Yes             Clinical Level of Care      Points  0-3  Level 1 []     Points  4-6  Level 2 [x]     Points  7-8  Level 3 []     Points  9-10  Level 4 []     Points  11-12  Level 5 []       Electronically signed by Carole Lane RN on 6/27/2019 at 10:00 AM

## 2019-06-27 NOTE — PROGRESS NOTES
and all questions were answered. 75% of time was spent discussing findings, management, and treatment options.             Electronically signed by Sonam Francois MD on 6/27/2019 at 10:00 AM

## 2019-06-28 LAB
BILIRUBIN URINE: NEGATIVE MG/DL
BLOOD, URINE: ABNORMAL
CLARITY: CLEAR
COLOR: YELLOW
GLUCOSE URINE: NEGATIVE MG/DL
KETONES, URINE: NEGATIVE MG/DL
LEUKOCYTE ESTERASE, URINE: ABNORMAL
NITRITE, URINE: NEGATIVE
PH UA: 7 (ref 5–8)
PROTEIN UA: NEGATIVE MG/DL
SPECIFIC GRAVITY UA: 1.02 (ref 1–1.03)
URINE CULTURE, ROUTINE: NORMAL
UROBILINOGEN, URINE: 0.2 E.U./DL

## 2019-07-09 ENCOUNTER — HOSPITAL ENCOUNTER (OUTPATIENT)
Dept: OBGYN CLINIC | Age: 34
Discharge: HOME | End: 2019-07-09

## 2019-07-09 ENCOUNTER — HOSPITAL ENCOUNTER (OUTPATIENT)
Dept: ULTRASOUND IMAGING | Age: 34
Discharge: HOME | End: 2019-07-09

## 2019-07-09 VITALS
SYSTOLIC BLOOD PRESSURE: 105 MMHG | BODY MASS INDEX: 33.59 KG/M2 | DIASTOLIC BLOOD PRESSURE: 68 MMHG | WEIGHT: 172 LBS | HEART RATE: 79 BPM

## 2019-07-09 DIAGNOSIS — D21.9 FIBROID: Chronic | ICD-10-CM

## 2019-07-09 DIAGNOSIS — Z34.91 NORMAL PREGNANCY IN FIRST TRIMESTER: ICD-10-CM

## 2019-07-09 LAB
BILIRUBIN URINE: NEGATIVE MG/DL
BLOOD, URINE: ABNORMAL
CLARITY: CLEAR
COLOR: YELLOW
GLUCOSE URINE: NEGATIVE MG/DL
KETONES, URINE: NEGATIVE MG/DL
LEUKOCYTE ESTERASE, URINE: ABNORMAL
NITRITE, URINE: NEGATIVE
PH UA: 7 (ref 5–8)
PROTEIN UA: ABNORMAL MG/DL
SPECIFIC GRAVITY UA: 1.02 (ref 1–1.03)
UROBILINOGEN, URINE: 0.2 E.U./DL

## 2019-07-09 PROCEDURE — 87491 CHLMYD TRACH DNA AMP PROBE: CPT

## 2019-07-09 PROCEDURE — 99212 OFFICE O/P EST SF 10 MIN: CPT

## 2019-07-09 PROCEDURE — 87081 CULTURE SCREEN ONLY: CPT

## 2019-07-09 PROCEDURE — 99213 OFFICE O/P EST LOW 20 MIN: CPT | Performed by: OBSTETRICS & GYNECOLOGY

## 2019-07-09 PROCEDURE — 81003 URINALYSIS AUTO W/O SCOPE: CPT

## 2019-07-09 PROCEDURE — 99211 OFF/OP EST MAY X REQ PHY/QHP: CPT

## 2019-07-09 PROCEDURE — 87591 N.GONORRHOEAE DNA AMP PROB: CPT

## 2019-07-09 PROCEDURE — 76816 OB US FOLLOW-UP PER FETUS: CPT

## 2019-07-09 NOTE — PLAN OF CARE
Prenatal Clinic  Clinical Level of [address].    NAME: Rik Rosario  YOB: 1985 GENDER: female  MEDICAL RECORD NUMBER:  [id number]  DATE:  7/9/2019    Assessment   Points   No Assessment []   0   General Prenatal assessment (e.g. weight, vital signs, urine test). Completed OB Clinic navigator tabs, in partnership with the Registered Nurse. []   1   General Prenatal assessment (e.g. weight, vital signs, urine test). Completed OB Clinic navigator tabs, in partnership with the Registered Nurse. Set up tests or procedures (e.g. ultrasound, specimen swabs, induction). [x]   2   Full Prenatal assessment (e.g. weight, vitals signs, urine test) to include a full review of history. Completed OB Clinic navigator tabs, in partnership with the Registered Nurse. Set up tests or procedures (e.g. ultrasound, specimen swabs, induction). Or transfer to an outside facility, transfer to [facility] for further evaluation, admission to the hospital.  []   3       Ambulation Status   Status Definition Points   Independent Independently able to ambulate. Fully able (without any assistance) to get on/off exam table/chair. [x]   0   Minimal Physical Assistance Requires physical assistance of one person to ambulate and/or position patient to be examined. Includes necessary physical assistance to position lower extremities on/off stool. []   1   Moderate Physical Assistance Requires at least one staff member to physically assist patient in ambulating into treatment room, and on/off recliner chair. []   2   Full Assistance Requires assistance of at least two staff members to transfer patient into treatment room and/or on/off exam table/chair. \"Total Transfer\". []   3       Teaching Effort   Effort Definition Points   No Teaching  []   0   General The teaching will focus on visit schedule and laboratory tests. This education can be found in the Discharge Instructions.  [x]   1

## 2019-07-09 NOTE — PROGRESS NOTES
[place]      Prenatal Multivit-Min-Fe-FA (PRENATAL VITAMINS) 0.8 MG TABS One qd 30 tablet 2     No current facility-administered medications on file prior to encounter. REVIEW OF SYSTEMS    Pertinent items are noted in HPI. Objective:      /68   Pulse 79   Wt 172 lb (78 kg)   LMP 10/31/2018   BMI 33.59 kg/m²     Wt Readings from Last 2 Encounters:   19 172 lb (78 kg)   19 169 lb 4 oz (76.8 kg)       See ACOG flowsheet above    Physical Exam:  A physical exam was not performed during this visit. Pelvic Exam:  A pelvic exam was completed during this visit. Verbal consent obtained for pelvic exam:  yes  Cervix: closed/thick/high. Specimens obtained: None. GC/CT, GBS today    Response to pelvic exam:  Well tolerated by patient. Assessment:     Patient Active Problem List   Diagnosis Code    Fibroid D21.9    34 yo   Z34.91    Anemia affecting pregnancy in second trimester O99.012       29 y.o. Oval Coffer 35w6d    Obstetrician: Stewart Olea MD      Plan:     - Prenatal labs: Reviewed by the physician  - Patient to follow up in: 1 week  sono today, fibroid smaller      Kick count reinforced. Pregnancy expectations were discussed and all questions were answered.             Electronically signed by Stewart Olea MD on 2019 at 2:36 PM

## 2019-07-09 NOTE — FLOWSHEET NOTE
Scheduled prenatal visit. Chart reviewed with patient.  present at this visit. Discussed episodes of slight vaginal bleeding with patient - see visit notes.

## 2019-07-10 LAB
C TRACH DNA GENITAL QL NAA+PROBE: NEGATIVE
N. GONORRHOEAE DNA: NEGATIVE

## 2019-07-12 LAB — GROUP B STREP CULTURE: NORMAL

## 2019-07-16 ENCOUNTER — HOSPITAL ENCOUNTER (OUTPATIENT)
Dept: OBGYN CLINIC | Age: 34
Discharge: HOME | End: 2019-07-16

## 2019-07-16 VITALS
WEIGHT: 172.38 LBS | SYSTOLIC BLOOD PRESSURE: 109 MMHG | HEART RATE: 74 BPM | BODY MASS INDEX: 33.66 KG/M2 | DIASTOLIC BLOOD PRESSURE: 66 MMHG

## 2019-07-16 LAB
BILIRUBIN URINE: NEGATIVE MG/DL
BLOOD, URINE: ABNORMAL
CLARITY: CLEAR
COLOR: YELLOW
GLUCOSE URINE: NEGATIVE MG/DL
KETONES, URINE: ABNORMAL MG/DL
LEUKOCYTE ESTERASE, URINE: ABNORMAL
NITRITE, URINE: NEGATIVE
PH UA: 7 (ref 5–8)
PROTEIN UA: ABNORMAL MG/DL
SPECIFIC GRAVITY UA: 1.02 (ref 1–1.03)
UROBILINOGEN, URINE: 0.2 E.U./DL

## 2019-07-16 PROCEDURE — 87086 URINE CULTURE/COLONY COUNT: CPT

## 2019-07-16 PROCEDURE — 81003 URINALYSIS AUTO W/O SCOPE: CPT

## 2019-07-16 PROCEDURE — 99213 OFFICE O/P EST LOW 20 MIN: CPT | Performed by: OBSTETRICS & GYNECOLOGY

## 2019-07-16 NOTE — PROGRESS NOTES
Multivit-Min-Fe-FA (PRENATAL VITAMINS PO) Take by mouth   Yes Historical Provider, MD   ondansetron (ZOFRAN) 4 MG tablet Take 1 tablet by mouth every 8 hours as needed for Nausea or Vomiting 3/14/19   Chas Briceño MD   Multiple Vitamins-Minerals (MULTIVITAL PO) Take by mouth    Historical Provider, MD   Prenatal Multivit-Min-Fe-FA (PRENATAL VITAMINS) 0.8 MG TABS One qd 16   Jacki Rose MD       REVIEW OF SYSTEMS    Pertinent items are noted in HPI. Objective:      /66   Pulse 74   Wt 172 lb 6 oz (78.2 kg)   LMP 10/31/2018   BMI 33.66 kg/m²     Wt Readings from Last 2 Encounters:   19 172 lb 6 oz (78.2 kg)   19 172 lb (78 kg)       See ACOG flowsheet above    Physical Exam:  A physical exam was performed during this visit. Neurologic/Psychiatric: alert and oriented X3  Constitutional: alert and oriented to person, place and time, well-developed and well-nourished, in no acute distress  Gastrointestinal: Abdomen  is soft and  is non tender and gravid  Vagina: Normal vagina. Bladder: non tender to palpitation. Uterus:  Non tender. Pelvic Exam:  A pelvic exam was completed during this visit. Verbal consent obtained for pelvic exam:  yes  Cervix: 1+/50/-3. Specimens obtained: None. Response to pelvic exam:  Well tolerated by patient. Assessment:     Patient Active Problem List   Diagnosis Code    Fibroid D21.9    36 yo   Z34.91    Anemia affecting pregnancy in second trimester O80.200       29 y.o.  36w6d    Obstetrician: Madhuri Bar MD      Plan:     - Prenatal labs: Reviewed by the physician  - precautions, kick counts reviewed  - GBS neg  - UCx sent  - Patient to follow up in:  1 week      Kick count reinforced. Pregnancy expectations were discussed and all questions were answered. 50% of time was spent discussing findings, management, and treatment options.             Electronically signed by Madhuri Bar MD on 2019 at 2:40

## 2019-07-18 LAB — URINE CULTURE, ROUTINE: NORMAL

## 2019-07-23 ENCOUNTER — HOSPITAL ENCOUNTER (OUTPATIENT)
Dept: OBGYN CLINIC | Age: 34
Discharge: HOME | End: 2019-07-23
Payer: MEDICAID

## 2019-07-23 VITALS
BODY MASS INDEX: 34.06 KG/M2 | HEART RATE: 105 BPM | WEIGHT: 174.38 LBS | SYSTOLIC BLOOD PRESSURE: 101 MMHG | DIASTOLIC BLOOD PRESSURE: 61 MMHG

## 2019-07-23 LAB
BILIRUBIN URINE: NEGATIVE MG/DL
BLOOD, URINE: ABNORMAL
CLARITY: CLEAR
COLOR: YELLOW
GLUCOSE URINE: NEGATIVE MG/DL
KETONES, URINE: ABNORMAL MG/DL
LEUKOCYTE ESTERASE, URINE: ABNORMAL
NITRITE, URINE: NEGATIVE
PH UA: 6.5 (ref 5–8)
PROTEIN UA: 30 MG/DL
SPECIFIC GRAVITY UA: 1.02 (ref 1–1.03)
UROBILINOGEN, URINE: 0.2 E.U./DL

## 2019-07-23 PROCEDURE — 81003 URINALYSIS AUTO W/O SCOPE: CPT

## 2019-07-23 PROCEDURE — 99212 OFFICE O/P EST SF 10 MIN: CPT | Performed by: OBSTETRICS & GYNECOLOGY

## 2019-07-23 PROCEDURE — 99211 OFF/OP EST MAY X REQ PHY/QHP: CPT

## 2019-07-23 NOTE — FLOWSHEET NOTE
Here for prenatal visit. Chart reviewed with patient. C/O ankle edema - small amount, non-pitting noted. Denies vaginal bleeding, leaking of fluid ing or discharge. States +FM.

## 2019-07-23 NOTE — PLAN OF CARE
Prenatal Clinic  Clinical Level of [address].    NAME: Hershell Scheuermann  YOB: 1985 GENDER: female  MEDICAL RECORD NUMBER:  [id number]  DATE:  7/23/2019    Assessment   Points   No Assessment []   0   General Prenatal assessment (e.g. weight, vital signs, urine test). Completed OB Clinic navigator tabs, in partnership with the Registered Nurse. [x]   1   General Prenatal assessment (e.g. weight, vital signs, urine test). Completed OB Clinic navigator tabs, in partnership with the Registered Nurse. Set up tests or procedures (e.g. ultrasound, specimen swabs, induction). []   2   Full Prenatal assessment (e.g. weight, vitals signs, urine test) to include a full review of history. Completed OB Clinic navigator tabs, in partnership with the Registered Nurse. Set up tests or procedures (e.g. ultrasound, specimen swabs, induction). Or transfer to an outside facility, transfer to [facility] for further evaluation, admission to the hospital.  []   3       Ambulation Status   Status Definition Points   Independent Independently able to ambulate. Fully able (without any assistance) to get on/off exam table/chair. [x]   0   Minimal Physical Assistance Requires physical assistance of one person to ambulate and/or position patient to be examined. Includes necessary physical assistance to position lower extremities on/off stool. []   1   Moderate Physical Assistance Requires at least one staff member to physically assist patient in ambulating into treatment room, and on/off recliner chair. []   2   Full Assistance Requires assistance of at least two staff members to transfer patient into treatment room and/or on/off exam table/chair. \"Total Transfer\". []   3       Teaching Effort   Effort Definition Points   No Teaching  []   0   General The teaching will focus on visit schedule and laboratory tests. This education can be found in the Discharge Instructions.  [x]   1

## 2019-07-23 NOTE — PROGRESS NOTES
ondansetron (ZOFRAN) 4 MG tablet Take 1 tablet by mouth every 8 hours as needed for Nausea or Vomiting 3/14/19   Ignacia Conway MD   Multiple Vitamins-Minerals (MULTIVITAL PO) Take by mouth    Historical Provider, MD   Prenatal Multivit-Min-Fe-FA (PRENATAL VITAMINS) 0.8 MG TABS One qd 16   Tricia Welch MD       REVIEW OF SYSTEMS    Pertinent items are noted in HPI. Objective:      /61   Pulse 105   Wt 174 lb 6 oz (79.1 kg)   LMP 10/31/2018   BMI 34.06 kg/m²     Wt Readings from Last 2 Encounters:   19 174 lb 6 oz (79.1 kg)   19 172 lb 6 oz (78.2 kg)       See ACOG flowsheet above    Physical Exam:  A physical exam was performed during this visit. Neurologic/Psychiatric: alert and oriented X3  Constitutional: alert and oriented to person, place and time, well-developed and well-nourished, in no acute distress  Cardiovascular: Edema  is not present. Respiratory effort: Respirations  are easy and without stridor. Gastrointestinal: Abdomen  is soft and  is non tender and gravid  Vagina: Normal vagina. Bladder: Not examined at this visit. Uterus:  Non tender. Pelvic Exam:  A pelvic exam was completed during this visit. Verbal consent obtained for pelvic exam:  yes  Cervix: 1 70. Specimens obtained: None. Response to pelvic exam:  Well tolerated by patient. Assessment:     Patient Active Problem List   Diagnosis Code    Fibroid D21.9    34 yo   Z34.91    Anemia affecting pregnancy in second trimester O80.200       29 y.o.  37w6d    Obstetrician: Eliezer Jimenez MD      Plan:     - Prenatal labs: Reviewed by the physician  - Patient to follow up in:  1 week      Kick count reinforced. Pregnancy expectations were discussed and all questions were answered.               Electronically signed by Eliezer Jimenez MD on 2019 at 2:59 PM

## 2019-07-30 ENCOUNTER — HOSPITAL ENCOUNTER (INPATIENT)
Age: 34
LOS: 2 days | Discharge: HOME | DRG: 560 | End: 2019-08-01
Attending: OBSTETRICS & GYNECOLOGY | Admitting: OBSTETRICS & GYNECOLOGY
Payer: MEDICAID

## 2019-07-30 ENCOUNTER — HOSPITAL ENCOUNTER (OUTPATIENT)
Age: 34
Discharge: HOME | DRG: 560 | End: 2019-07-30
Attending: OBSTETRICS & GYNECOLOGY | Admitting: OBSTETRICS & GYNECOLOGY
Payer: MEDICAID

## 2019-07-30 ENCOUNTER — ANESTHESIA (OUTPATIENT)
Dept: LABOR AND DELIVERY | Age: 34
DRG: 560 | End: 2019-07-30
Payer: MEDICAID

## 2019-07-30 ENCOUNTER — TELEPHONE (OUTPATIENT)
Dept: OBGYN CLINIC | Age: 34
End: 2019-07-30

## 2019-07-30 ENCOUNTER — ANESTHESIA EVENT (OUTPATIENT)
Dept: LABOR AND DELIVERY | Age: 34
DRG: 560 | End: 2019-07-30
Payer: MEDICAID

## 2019-07-30 VITALS
SYSTOLIC BLOOD PRESSURE: 117 MMHG | TEMPERATURE: 97.6 F | HEIGHT: 61 IN | RESPIRATION RATE: 18 BRPM | BODY MASS INDEX: 32.85 KG/M2 | DIASTOLIC BLOOD PRESSURE: 76 MMHG | WEIGHT: 174 LBS | HEART RATE: 73 BPM

## 2019-07-30 PROBLEM — Z37.9 NORMAL LABOR: Status: ACTIVE | Noted: 2019-07-30

## 2019-07-30 LAB
ABO/RH: NORMAL
AMPHETAMINE SCREEN, URINE: NORMAL
ANTIBODY SCREEN: NORMAL
BARBITURATE SCREEN URINE: NORMAL
BASOPHILS ABSOLUTE: 0.1 K/UL (ref 0–0.2)
BASOPHILS RELATIVE PERCENT: 0.4 %
BENZODIAZEPINE SCREEN, URINE: NORMAL
BUPRENORPHINE URINE: NORMAL
CANNABINOID SCREEN URINE: NORMAL
COCAINE METABOLITE SCREEN URINE: NORMAL
EOSINOPHILS ABSOLUTE: 0.1 K/UL (ref 0–0.6)
EOSINOPHILS RELATIVE PERCENT: 0.4 %
HCT VFR BLD CALC: 38.4 % (ref 36–48)
HEMOGLOBIN: 12.4 G/DL (ref 12–16)
LYMPHOCYTES ABSOLUTE: 1.8 K/UL (ref 1–5.1)
LYMPHOCYTES RELATIVE PERCENT: 11.6 %
Lab: NORMAL
MCH RBC QN AUTO: 25.9 PG (ref 26–34)
MCHC RBC AUTO-ENTMCNC: 32.4 G/DL (ref 31–36)
MCV RBC AUTO: 79.9 FL (ref 80–100)
METHADONE SCREEN, URINE: NORMAL
MONOCYTES ABSOLUTE: 1 K/UL (ref 0–1.3)
MONOCYTES RELATIVE PERCENT: 6.2 %
NEUTROPHILS ABSOLUTE: 12.9 K/UL (ref 1.7–7.7)
NEUTROPHILS RELATIVE PERCENT: 81.4 %
OPIATE SCREEN URINE: NORMAL
OXYCODONE URINE: NORMAL
PDW BLD-RTO: 17.4 % (ref 12.4–15.4)
PH UA: 6.5
PHENCYCLIDINE SCREEN URINE: NORMAL
PLATELET # BLD: 314 K/UL (ref 135–450)
PMV BLD AUTO: 7.4 FL (ref 5–10.5)
PROPOXYPHENE SCREEN: NORMAL
RBC # BLD: 4.8 M/UL (ref 4–5.2)
TOTAL SYPHILLIS IGG/IGM: NORMAL
WBC # BLD: 15.9 K/UL (ref 4–11)

## 2019-07-30 PROCEDURE — 2580000003 HC RX 258: Performed by: OBSTETRICS & GYNECOLOGY

## 2019-07-30 PROCEDURE — 85025 COMPLETE CBC W/AUTO DIFF WBC: CPT

## 2019-07-30 PROCEDURE — 0KQM0ZZ REPAIR PERINEUM MUSCLE, OPEN APPROACH: ICD-10-PCS | Performed by: OBSTETRICS & GYNECOLOGY

## 2019-07-30 PROCEDURE — 80307 DRUG TEST PRSMV CHEM ANLYZR: CPT

## 2019-07-30 PROCEDURE — 86901 BLOOD TYPING SEROLOGIC RH(D): CPT

## 2019-07-30 PROCEDURE — 99212 OFFICE O/P EST SF 10 MIN: CPT

## 2019-07-30 PROCEDURE — 7200000001 HC VAGINAL DELIVERY

## 2019-07-30 PROCEDURE — 86850 RBC ANTIBODY SCREEN: CPT

## 2019-07-30 PROCEDURE — 59025 FETAL NON-STRESS TEST: CPT

## 2019-07-30 PROCEDURE — 6370000000 HC RX 637 (ALT 250 FOR IP): Performed by: OBSTETRICS & GYNECOLOGY

## 2019-07-30 PROCEDURE — 3700000025 EPIDURAL BLOCK: Performed by: ANESTHESIOLOGY

## 2019-07-30 PROCEDURE — 96374 THER/PROPH/DIAG INJ IV PUSH: CPT

## 2019-07-30 PROCEDURE — 6360000002 HC RX W HCPCS: Performed by: OBSTETRICS & GYNECOLOGY

## 2019-07-30 PROCEDURE — 86780 TREPONEMA PALLIDUM: CPT

## 2019-07-30 PROCEDURE — 51701 INSERT BLADDER CATHETER: CPT

## 2019-07-30 PROCEDURE — 1220000000 HC SEMI PRIVATE OB R&B

## 2019-07-30 PROCEDURE — 86900 BLOOD TYPING SEROLOGIC ABO: CPT

## 2019-07-30 PROCEDURE — 2500000003 HC RX 250 WO HCPCS

## 2019-07-30 PROCEDURE — 2500000003 HC RX 250 WO HCPCS: Performed by: NURSE ANESTHETIST, CERTIFIED REGISTERED

## 2019-07-30 PROCEDURE — 6360000002 HC RX W HCPCS: Performed by: ANESTHESIOLOGY

## 2019-07-30 PROCEDURE — 10907ZC DRAINAGE OF AMNIOTIC FLUID, THERAPEUTIC FROM PRODUCTS OF CONCEPTION, VIA NATURAL OR ARTIFICIAL OPENING: ICD-10-PCS | Performed by: OBSTETRICS & GYNECOLOGY

## 2019-07-30 RX ORDER — DOCUSATE SODIUM 100 MG/1
100 CAPSULE, LIQUID FILLED ORAL 2 TIMES DAILY
Status: DISCONTINUED | OUTPATIENT
Start: 2019-07-30 | End: 2019-07-30 | Stop reason: HOSPADM

## 2019-07-30 RX ORDER — LANOLIN 100 %
OINTMENT (GRAM) TOPICAL PRN
Status: DISCONTINUED | OUTPATIENT
Start: 2019-07-30 | End: 2019-08-01 | Stop reason: HOSPADM

## 2019-07-30 RX ORDER — DOCUSATE SODIUM 100 MG/1
100 CAPSULE, LIQUID FILLED ORAL 2 TIMES DAILY
Status: DISCONTINUED | OUTPATIENT
Start: 2019-07-30 | End: 2019-08-01 | Stop reason: HOSPADM

## 2019-07-30 RX ORDER — HYDROCODONE BITARTRATE AND ACETAMINOPHEN 5; 325 MG/1; MG/1
1 TABLET ORAL EVERY 4 HOURS PRN
Status: DISCONTINUED | OUTPATIENT
Start: 2019-07-30 | End: 2019-08-01 | Stop reason: HOSPADM

## 2019-07-30 RX ORDER — IBUPROFEN 400 MG/1
800 TABLET ORAL EVERY 8 HOURS PRN
Status: DISCONTINUED | OUTPATIENT
Start: 2019-07-30 | End: 2019-08-01 | Stop reason: HOSPADM

## 2019-07-30 RX ORDER — SODIUM CHLORIDE, SODIUM LACTATE, POTASSIUM CHLORIDE, CALCIUM CHLORIDE 600; 310; 30; 20 MG/100ML; MG/100ML; MG/100ML; MG/100ML
INJECTION, SOLUTION INTRAVENOUS CONTINUOUS
Status: DISCONTINUED | OUTPATIENT
Start: 2019-07-30 | End: 2019-08-01 | Stop reason: HOSPADM

## 2019-07-30 RX ORDER — SODIUM CHLORIDE 0.9 % (FLUSH) 0.9 %
10 SYRINGE (ML) INJECTION PRN
Status: DISCONTINUED | OUTPATIENT
Start: 2019-07-30 | End: 2019-08-01 | Stop reason: HOSPADM

## 2019-07-30 RX ORDER — SODIUM CHLORIDE 0.9 % (FLUSH) 0.9 %
10 SYRINGE (ML) INJECTION EVERY 12 HOURS SCHEDULED
Status: DISCONTINUED | OUTPATIENT
Start: 2019-07-30 | End: 2019-08-01 | Stop reason: HOSPADM

## 2019-07-30 RX ORDER — ONDANSETRON 2 MG/ML
4 INJECTION INTRAMUSCULAR; INTRAVENOUS EVERY 6 HOURS PRN
Status: DISCONTINUED | OUTPATIENT
Start: 2019-07-30 | End: 2019-07-30 | Stop reason: HOSPADM

## 2019-07-30 RX ORDER — ACETAMINOPHEN 325 MG/1
650 TABLET ORAL EVERY 4 HOURS PRN
Status: DISCONTINUED | OUTPATIENT
Start: 2019-07-30 | End: 2019-07-30 | Stop reason: HOSPADM

## 2019-07-30 RX ORDER — TERBUTALINE SULFATE 1 MG/ML
0.25 INJECTION, SOLUTION SUBCUTANEOUS ONCE
Status: DISCONTINUED | OUTPATIENT
Start: 2019-07-30 | End: 2019-08-01 | Stop reason: HOSPADM

## 2019-07-30 RX ORDER — ACETAMINOPHEN 325 MG/1
650 TABLET ORAL EVERY 4 HOURS PRN
Status: DISCONTINUED | OUTPATIENT
Start: 2019-07-30 | End: 2019-08-01 | Stop reason: HOSPADM

## 2019-07-30 RX ORDER — LIDOCAINE HYDROCHLORIDE AND EPINEPHRINE 15; 5 MG/ML; UG/ML
INJECTION, SOLUTION EPIDURAL PRN
Status: DISCONTINUED | OUTPATIENT
Start: 2019-07-30 | End: 2019-07-30 | Stop reason: SDUPTHER

## 2019-07-30 RX ORDER — NALOXONE HYDROCHLORIDE 0.4 MG/ML
0.4 INJECTION, SOLUTION INTRAMUSCULAR; INTRAVENOUS; SUBCUTANEOUS PRN
Status: DISCONTINUED | OUTPATIENT
Start: 2019-07-30 | End: 2019-07-30 | Stop reason: HOSPADM

## 2019-07-30 RX ORDER — BUPIVACAINE HYDROCHLORIDE 2.5 MG/ML
INJECTION, SOLUTION EPIDURAL; INFILTRATION; INTRACAUDAL PRN
Status: DISCONTINUED | OUTPATIENT
Start: 2019-07-30 | End: 2019-07-30 | Stop reason: SDUPTHER

## 2019-07-30 RX ORDER — SODIUM CHLORIDE 0.9 % (FLUSH) 0.9 %
10 SYRINGE (ML) INJECTION PRN
Status: DISCONTINUED | OUTPATIENT
Start: 2019-07-30 | End: 2019-07-30 | Stop reason: HOSPADM

## 2019-07-30 RX ORDER — HYDROCODONE BITARTRATE AND ACETAMINOPHEN 5; 325 MG/1; MG/1
2 TABLET ORAL EVERY 4 HOURS PRN
Status: DISCONTINUED | OUTPATIENT
Start: 2019-07-30 | End: 2019-08-01 | Stop reason: HOSPADM

## 2019-07-30 RX ORDER — NALBUPHINE HCL 10 MG/ML
5 AMPUL (ML) INJECTION EVERY 4 HOURS PRN
Status: DISCONTINUED | OUTPATIENT
Start: 2019-07-30 | End: 2019-07-30 | Stop reason: HOSPADM

## 2019-07-30 RX ORDER — NICOTINE 21 MG/24HR
1 PATCH, TRANSDERMAL 24 HOURS TRANSDERMAL DAILY
Status: DISCONTINUED | OUTPATIENT
Start: 2019-07-30 | End: 2019-08-01 | Stop reason: HOSPADM

## 2019-07-30 RX ORDER — CARBOPROST TROMETHAMINE 250 UG/ML
250 INJECTION, SOLUTION INTRAMUSCULAR PRN
Status: DISCONTINUED | OUTPATIENT
Start: 2019-07-30 | End: 2019-08-01 | Stop reason: HOSPADM

## 2019-07-30 RX ORDER — METHYLERGONOVINE MALEATE 0.2 MG/ML
200 INJECTION INTRAVENOUS PRN
Status: DISCONTINUED | OUTPATIENT
Start: 2019-07-30 | End: 2019-08-01 | Stop reason: HOSPADM

## 2019-07-30 RX ADMIN — ONDANSETRON 4 MG: 2 INJECTION INTRAMUSCULAR; INTRAVENOUS at 12:12

## 2019-07-30 RX ADMIN — SODIUM CHLORIDE, POTASSIUM CHLORIDE, SODIUM LACTATE AND CALCIUM CHLORIDE: 600; 310; 30; 20 INJECTION, SOLUTION INTRAVENOUS at 09:30

## 2019-07-30 RX ADMIN — SODIUM CHLORIDE, POTASSIUM CHLORIDE, SODIUM LACTATE AND CALCIUM CHLORIDE: 600; 310; 30; 20 INJECTION, SOLUTION INTRAVENOUS at 09:58

## 2019-07-30 RX ADMIN — IBUPROFEN 800 MG: 400 TABLET ORAL at 19:29

## 2019-07-30 RX ADMIN — Medication 1 MILLI-UNITS/MIN: at 10:20

## 2019-07-30 RX ADMIN — DOCUSATE SODIUM 100 MG: 100 CAPSULE, LIQUID FILLED ORAL at 19:29

## 2019-07-30 RX ADMIN — LIDOCAINE HYDROCHLORIDE AND EPINEPHRINE 3 ML: 15; 5 INJECTION, SOLUTION EPIDURAL at 09:36

## 2019-07-30 RX ADMIN — BUPIVACAINE HYDROCHLORIDE 1.2 ML: 2.5 INJECTION, SOLUTION EPIDURAL; INFILTRATION; INTRACAUDAL at 09:34

## 2019-07-30 ASSESSMENT — PAIN SCALES - GENERAL: PAINLEVEL_OUTOF10: 5

## 2019-07-30 ASSESSMENT — PAIN DESCRIPTION - LOCATION: LOCATION: ABDOMEN

## 2019-07-30 ASSESSMENT — PAIN - FUNCTIONAL ASSESSMENT: PAIN_FUNCTIONAL_ASSESSMENT: ACTIVITIES ARE NOT PREVENTED

## 2019-07-30 ASSESSMENT — PAIN DESCRIPTION - DESCRIPTORS
DESCRIPTORS: CRAMPING

## 2019-07-30 ASSESSMENT — PAIN DESCRIPTION - PAIN TYPE: TYPE: ACUTE PAIN

## 2019-07-30 ASSESSMENT — PAIN DESCRIPTION - ONSET: ONSET: GRADUAL

## 2019-07-30 ASSESSMENT — PAIN DESCRIPTION - FREQUENCY: FREQUENCY: INTERMITTENT

## 2019-07-30 ASSESSMENT — PAIN DESCRIPTION - PROGRESSION: CLINICAL_PROGRESSION: GRADUALLY WORSENING

## 2019-07-30 ASSESSMENT — PAIN DESCRIPTION - ORIENTATION: ORIENTATION: LOWER

## 2019-07-30 NOTE — ANESTHESIA PRE PROCEDURE
Department of Anesthesiology  Preprocedure Note       Name:  Woodrow Vargas   Age:  29 y.o.  :  1985                                          MRN:  [id number]         Date:  2019      Surgeon: * No surgeons listed *    Procedure: Labor Analgesia    Medications prior to admission:   Prior to Admission medications    Medication Sig Start Date End Date Taking?  Authorizing Provider   ferrous sulfate 325 (65 Fe) MG tablet Take 325 mg by mouth daily (with breakfast)   Yes Historical Provider, MD   Multiple Vitamins-Minerals (MULTIVITAL PO) Take by mouth   Yes Historical Provider, MD   ondansetron (ZOFRAN ODT) 4 MG disintegrating tablet Take 1 tablet by mouth every 8 hours as needed for Nausea or Vomiting 3/22/19   YANIRA Zee CNM   ondansetron (ZOFRAN) 4 MG tablet Take 1 tablet by mouth every 8 hours as needed for Nausea or Vomiting 3/14/19   Ron Silvestre MD   Prenatal Multivit-Min-Fe-FA (PRENATAL VITAMINS PO) Take by mouth    Historical Provider, MD   Prenatal Multivit-Min-Fe-FA (PRENATAL VITAMINS) 0.8 MG TABS One qd 16   Inés Bullock MD       Current medications:    Current Facility-Administered Medications   Medication Dose Route Frequency Provider Last Rate Last Dose    oxytocin (PITOCIN) 30 units in 500 mL infusion Override Pull             lactated ringers infusion   Intravenous Continuous Masoud Rios  mL/hr at 19 0958      sodium chloride flush 0.9 % injection 10 mL  10 mL Intravenous 2 times per day Masoud Rios MD        sodium chloride flush 0.9 % injection 10 mL  10 mL Intravenous PRN Masoud Rios MD        acetaminophen (TYLENOL) tablet 650 mg  650 mg Oral Q4H PRN Masoud Rios MD        docusate sodium (COLACE) capsule 100 mg  100 mg Oral BID Masoud Rios MD        ondansetron (ZOFRAN) injection 4 mg  4 mg Intravenous Q6H PRN Masoud Rios MD        terbutaline (BRETHINE) injection 0.25 mg  0.25 mg Subcutaneous Once Connie Marcano MD        oxytocin (PITOCIN) 30 units in 500 mL infusion  1 anastasiya-units/min Intravenous Continuous PRN Connie Marcano MD        methylergonovine (METHERGINE) injection 200 mcg  200 mcg Intramuscular PRN Connie Marcano MD        carboprost (HEMABATE) injection 250 mcg  250 mcg Intramuscular PRN Connie , MD        witch hazel-glycerin (TUCKS) pad   Topical PRN Connie Marcano MD        benzocaine-menthol (DERMOPLAST) 20-0.5 % spray   Topical PRN Connie Marcano MD        fentaNYL 3 mcg/ml bupivacaine 0.125% in sodium chloride 0.9% 100 mL epidural             naloxone (NARCAN) injection 0.4 mg  0.4 mg Intravenous PRN Martin Magaña MD        nalbuphine (NUBAIN) injection 5 mg  5 mg Intravenous Q4H PRN Martin Magaña MD        [facility]ron [facility]) injection 4 mg  4 mg Intravenous Q6H PRN Martin Magaña MD        fentaNYL 3mcg/ml bupivacaine 0.125% in sodium chloride 0.9% 250mL epidural  12 mL/hr Epidural Continuous Martin Magaña MD         Facility-Administered Medications Ordered in Other Encounters   Medication Dose Route Frequency Provider Last Rate Last Dose    bupivacaine (PF) (MARCAINE) 0.25 % injection    PRN YANIRA Wayne - CRNA   1.2 mL at 19 2974       Allergies:  No Known Allergies    Problem List:    Patient Active Problem List   Diagnosis Code    Fibroid D21.9    34 yo   Z34.91    Anemia affecting pregnancy in second trimester O99.012    Normal labor O80, Z37.9       Past Medical History:        Diagnosis Date    Anemia affecting pregnancy in second trimester 2019    Breast disorder 2015    breast biopsy right       Past Surgical History:  History reviewed. No pertinent surgical history. Social History:    Social History     Tobacco Use    Smoking status: Never Smoker    Smokeless tobacco: Never Used   Substance Use Topics    Alcohol use:  No

## 2019-07-31 PROCEDURE — 1220000000 HC SEMI PRIVATE OB R&B

## 2019-07-31 PROCEDURE — 6370000000 HC RX 637 (ALT 250 FOR IP): Performed by: OBSTETRICS & GYNECOLOGY

## 2019-07-31 RX ADMIN — IBUPROFEN 800 MG: 400 TABLET ORAL at 09:11

## 2019-07-31 RX ADMIN — DOCUSATE SODIUM 100 MG: 100 CAPSULE, LIQUID FILLED ORAL at 09:12

## 2019-07-31 RX ADMIN — DOCUSATE SODIUM 100 MG: 100 CAPSULE, LIQUID FILLED ORAL at 21:28

## 2019-07-31 RX ADMIN — IBUPROFEN 800 MG: 400 TABLET ORAL at 18:36

## 2019-07-31 ASSESSMENT — PAIN - FUNCTIONAL ASSESSMENT
PAIN_FUNCTIONAL_ASSESSMENT: ACTIVITIES ARE NOT PREVENTED
PAIN_FUNCTIONAL_ASSESSMENT: ACTIVITIES ARE NOT PREVENTED

## 2019-07-31 ASSESSMENT — PAIN DESCRIPTION - FREQUENCY
FREQUENCY: INTERMITTENT

## 2019-07-31 ASSESSMENT — PAIN DESCRIPTION - ORIENTATION
ORIENTATION: MID
ORIENTATION: MID;LOWER
ORIENTATION: MID;LOWER

## 2019-07-31 ASSESSMENT — PAIN DESCRIPTION - PAIN TYPE
TYPE: ACUTE PAIN

## 2019-07-31 ASSESSMENT — PAIN SCALES - GENERAL
PAINLEVEL_OUTOF10: 0
PAINLEVEL_OUTOF10: 0
PAINLEVEL_OUTOF10: 2
PAINLEVEL_OUTOF10: 0
PAINLEVEL_OUTOF10: 3
PAINLEVEL_OUTOF10: 1
PAINLEVEL_OUTOF10: 0
PAINLEVEL_OUTOF10: 0

## 2019-07-31 ASSESSMENT — PAIN DESCRIPTION - DESCRIPTORS
DESCRIPTORS: CRAMPING

## 2019-07-31 ASSESSMENT — PAIN DESCRIPTION - ONSET
ONSET: GRADUAL

## 2019-07-31 ASSESSMENT — PAIN DESCRIPTION - PROGRESSION
CLINICAL_PROGRESSION: GRADUALLY IMPROVING
CLINICAL_PROGRESSION: GRADUALLY WORSENING
CLINICAL_PROGRESSION: GRADUALLY IMPROVING

## 2019-07-31 ASSESSMENT — PAIN DESCRIPTION - LOCATION
LOCATION: ABDOMEN

## 2019-07-31 NOTE — ANESTHESIA POSTPROCEDURE EVALUATION
Department of Anesthesiology  Postprocedure Note    Patient: Kye Alatorre  MRN: [id number]  YOB: 1985  Date of evaluation: 7/31/2019  Time:  1:33 PM     Procedure Summary     Date:  07/30/19 Room / Location:      Anesthesia Start:  0930 Anesthesia Stop:  1489    Procedure:  Labor Analgesia Diagnosis:      Scheduled Providers:   Responsible Provider:  Angeles Wasserman MD    Anesthesia Type:  epidural ASA Status:  1 - Emergent          Anesthesia Type: No value filed. Ai Phase I: Ai Score: 9    Ai Phase II: Ai Score: 9    Last vitals: Reviewed and per EMR flowsheets.        Anesthesia Post Evaluation    Patient location during evaluation: floor  Patient participation: complete - patient participated  Level of consciousness: awake and alert  Pain score: 2  Airway patency: patent  Nausea & Vomiting: no vomiting and no nausea  Complications: no  Cardiovascular status: hemodynamically stable  Respiratory status: room air and spontaneous ventilation  Hydration status: stable

## 2019-08-01 VITALS
DIASTOLIC BLOOD PRESSURE: 69 MMHG | HEART RATE: 70 BPM | RESPIRATION RATE: 15 BRPM | SYSTOLIC BLOOD PRESSURE: 112 MMHG | TEMPERATURE: 98.2 F | OXYGEN SATURATION: 97 %

## 2019-08-01 PROCEDURE — 99999 PR OFFICE/OUTPT VISIT,PROCEDURE ONLY: CPT | Performed by: OBSTETRICS & GYNECOLOGY

## 2019-08-01 PROCEDURE — 6370000000 HC RX 637 (ALT 250 FOR IP): Performed by: OBSTETRICS & GYNECOLOGY

## 2019-08-01 RX ORDER — CETIRIZINE HYDROCHLORIDE 10 MG/1
10 TABLET ORAL DAILY
Status: DISCONTINUED | OUTPATIENT
Start: 2019-08-01 | End: 2019-08-01 | Stop reason: HOSPADM

## 2019-08-01 RX ORDER — PSEUDOEPHEDRINE HCL 30 MG
100 TABLET ORAL 2 TIMES DAILY
Qty: 60 CAPSULE | Refills: 1 | Status: SHIPPED | OUTPATIENT
Start: 2019-08-01

## 2019-08-01 RX ORDER — IBUPROFEN 800 MG/1
800 TABLET ORAL EVERY 8 HOURS PRN
Qty: 60 TABLET | Refills: 1 | Status: SHIPPED | OUTPATIENT
Start: 2019-08-01

## 2019-08-01 RX ADMIN — IBUPROFEN 800 MG: 400 TABLET ORAL at 04:46

## 2019-08-01 RX ADMIN — CETIRIZINE HYDROCHLORIDE 10 MG: 10 TABLET, FILM COATED ORAL at 13:04

## 2019-08-01 RX ADMIN — IBUPROFEN 800 MG: 400 TABLET ORAL at 13:04

## 2019-08-01 ASSESSMENT — PAIN DESCRIPTION - PROGRESSION
CLINICAL_PROGRESSION: GRADUALLY WORSENING
CLINICAL_PROGRESSION: GRADUALLY IMPROVING
CLINICAL_PROGRESSION: GRADUALLY WORSENING

## 2019-08-01 ASSESSMENT — PAIN DESCRIPTION - LOCATION
LOCATION: ABDOMEN

## 2019-08-01 ASSESSMENT — PAIN SCALES - GENERAL
PAINLEVEL_OUTOF10: 0
PAINLEVEL_OUTOF10: 0
PAINLEVEL_OUTOF10: 7
PAINLEVEL_OUTOF10: 5
PAINLEVEL_OUTOF10: 0

## 2019-08-01 ASSESSMENT — PAIN DESCRIPTION - ONSET
ONSET: GRADUAL

## 2019-08-01 ASSESSMENT — PAIN DESCRIPTION - PAIN TYPE
TYPE: ACUTE PAIN

## 2019-08-01 ASSESSMENT — PAIN DESCRIPTION - DESCRIPTORS
DESCRIPTORS: CRAMPING

## 2019-08-01 ASSESSMENT — PAIN DESCRIPTION - FREQUENCY
FREQUENCY: INTERMITTENT

## 2019-08-01 ASSESSMENT — PAIN DESCRIPTION - ORIENTATION
ORIENTATION: LOWER

## 2019-08-01 ASSESSMENT — PAIN - FUNCTIONAL ASSESSMENT
PAIN_FUNCTIONAL_ASSESSMENT: ACTIVITIES ARE NOT PREVENTED

## 2019-09-20 ENCOUNTER — HOSPITAL ENCOUNTER (OUTPATIENT)
Dept: OBGYN CLINIC | Age: 34
Discharge: HOME | End: 2019-09-20

## 2019-09-20 VITALS
BODY MASS INDEX: 30.61 KG/M2 | HEART RATE: 70 BPM | DIASTOLIC BLOOD PRESSURE: 73 MMHG | WEIGHT: 162 LBS | SYSTOLIC BLOOD PRESSURE: 117 MMHG

## 2019-09-20 NOTE — PROGRESS NOTES
Prenatal Clinic.                  Electronically signed by Eliezer Jimenez MD on 9/20/2019 at 10:55 AM        Prenatal [address], [address]

## 2021-01-30 NOTE — CARE COORDINATION
LSW met with patient for follow up. Patient states that she is overall doing well but does states that she is very tired due to not being able to get comfortable at night. Patient is having a boy and states that she is working on getting everything ready for infant. EPDS given and patient scored 3//30. At this time no other needs have been indicated.    Electronically signed by Balwinder Page on 6/13/2019 at 11:51 AM 97.9

## 2022-04-09 NOTE — CARE COORDINATION
EMERGENCY DEPARTMENT ENCOUNTER      NAME: Milagro Thomas  AGE: 44 year old female  YOB: 1977  MRN: [id number]  EVALUATION DATE & TIME: No admission date for patient encounter.    PCP: Misha Stevenson    ED PROVIDER: Manuel Martin M.D.      Chief Complaint   Patient presents with     Tachycardia     Chest Pain         FINAL IMPRESSION:  1. Atrial fibrillation with RVR (H)          ED COURSE & MEDICAL DECISION MAKING:    Pertinent Labs & Imaging studies reviewed. (See chart for details)  44 year old female presents to the Emergency Department for evaluation of palpitations.  On arrival patient appears to be in SVT.  Did attempt adenosine x2.  The did particularly help.  Did give the patient IV diltiazem with the heart rate slowing into the 130s.  This does reveal it to be A. fib with RVR.  Patient's labs show a low slightly low magnesium.  Otherwise unremarkable.  Did attempt cardioversion after consent.  This was unfortunately unsuccessful.  We will start the patient on diltiazem drip.  Will admit for further care.  She is on Coumadin.  INR borderline at 1.72.  I do not think she needs heparin at this time.  No chest pain currently.  No signs of ACS.    1:16 AM I met with the patient to gather history and to perform my initial exam. I discussed the plan for care while in the Emergency Department. PPE: eye protection, surgical mask and gloves.  4:19 AM Adenosine and Diltiazem did not improve her Afib. I performed cardioversion. Cardioversion failed to bring patient's heart rate down and stayed in Atrial Fibrillation with AVR. Will proceed to admit the patient. Discussed admission plan with the patient and her mother. They are agreeable with plan.  4:50 AM Discussed care with Dr. Faustina Fairbanks, resident regarding the patient. They accept the patient for admission.      At the conclusion of the encounter I discussed the results of all of the tests and the disposition. The questions were answered.  LSW met with patient for follow up. Patient states express that she has been very tired and states that she has been uncomfortable due to her belly being bigger with this pregnancy. Patient states that she has no concerns obtaining infant care items as she kept most items from her daughter. LSW then asked about relationship with , patient states that they have been very good. EPDS given and patient scored 3/30. At this time no other needs have been indicated.    Electronically signed by Silvestre Coon on 4/26/2019 at 10:23 AM The patient or family acknowledged understanding and was agreeable with the care plan.     Critical Care     Performed by: Dr Manuel Martin  Authorized by: Dr Manuel Martin  Total critical care time: 90 minutes  Critical care was necessary to treat or prevent imminent or life-threatening deterioration of the following conditions: afib with RVR  Critical care was time spent personally by me on the following activities: development of treatment plan with patient or surrogate, discussions with consultants, examination of patient, evaluation of patient's response to treatment, obtaining history from patient or surrogate, ordering and performing treatments and interventions, ordering and review of laboratory studies, ordering and review of radiographic studies, re-evaluation of patient's condition and monitoring for potential decompensation.  Critical care time was exclusive of separately billable procedures and treating other patients.      MEDICATIONS GIVEN IN THE EMERGENCY:  Medications   diltiazem (CARDIZEM) 125 mg in sodium chloride 0.9 % 125 mL infusion (5 mg/hr Intravenous New Bag 4/9/22 0438)   adenosine (ADENOCARD) injection 6 mg (6 mg Intravenous Given 4/9/22 0135)   adenosine (ADENOCARD) injection 12 mg (12 mg Intravenous Given 4/9/22 0142)   diltiazem (CARDIZEM) injection 25 mg (25 mg Intravenous Given 4/9/22 0148)   fentaNYL (PF) (SUBLIMAZE) injection 50 mcg (50 mcg Intravenous Given 4/9/22 0147)   magnesium sulfate 2 g in water intermittent infusion (0 g Intravenous Stopped 4/9/22 0301)   diltiazem (CARDIZEM) injection 25 mg (25 mg Intravenous Given 4/9/22 0222)   etomidate (AMIDATE) injection 15 mg (15 mg Intravenous Given 4/9/22 0422)   fentaNYL (PF) (SUBLIMAZE) injection 50 mcg (50 mcg Intravenous Given 4/9/22 0422)       NEW PRESCRIPTIONS STARTED AT TODAY'S ER VISIT  New Prescriptions    No medications on file          =================================================================    HPI    Patient  information was obtained from: patient     Use of : N/A        Milagro Thomas is a 44 year old female with a pertinent history of atrial fibrillation with AVR, T2DM, hypertension, anxiety, CALLY on CPAP, morbid obesity, who presents to this ED via walk in with mother for evaluation of chest pain and palpitations.    Patient reports she was trying to sleep but due to her chronic headache since her brain injury last November and had taken a headache medication she is unable to recall but says it starts with an I. Then she developed sudden onset of chest pain and palpitations. She also endorses associated mild shortness of breath. She did not take any medications prior to arrival but did take her Diltiazem last night. She also does take Warfarin daily but no other cardiac medications. She is no longer on Sotalol. She is unsure about her recent INR levels. She does have prior SVT from her first ablation where she did require IV medication to resolve her heart rate. Otherwise no nausea, vomiting, numbness, weakness. No other medical complaints at this time.       REVIEW OF SYSTEMS   Review of Systems   Respiratory: Positive for shortness of breath (mild).    Cardiovascular: Positive for chest pain and palpitations.   Gastrointestinal: Negative for nausea and vomiting.   Neurological: Positive for headaches (chronic). Negative for weakness and numbness.   All other systems reviewed and are negative.       PAST MEDICAL HISTORY:  History reviewed. No pertinent past medical history.    PAST SURGICAL HISTORY:  Past Surgical History:   Procedure Laterality Date      SECTION      x2     CHOLECYSTECTOMY       FOOT SURGERY      bone spur and reconstruction           CURRENT MEDICATIONS:    Current Facility-Administered Medications   Medication     diltiazem (CARDIZEM) 125 mg in sodium chloride 0.9 % 125 mL infusion     Current Outpatient Medications   Medication     clobetasol (TEMOVATE) 0.05 % cream      diltiazem (CARDIZEM) 60 MG tablet     EPINEPHrine (EPIPEN/ADRENACLICK) 0.3 mg/0.3 mL injection     fluticasone (FLONASE) 50 mcg/actuation nasal spray     hydrocortisone 1 % cream     hydrOXYzine HCl (ATARAX) 25 MG tablet     levonorgestrel (MIRENA) 20 mcg/24 hr (5 years) IUD     magnesium oxide (MAG-OX) 400 mg (241.3 mg magnesium) tablet     MEDICATION CANNOT BE REORDERED - PLEASE MANUALLY REORDER AND DISCONTINUE THE OLD ORDER     melatonin 3 mg Tab tablet     metFORMIN (GLUCOPHAGE-XR) 500 MG 24 hr tablet     sotalol (BETAPACE) 120 MG tablet     warfarin (COUMADIN/JANTOVEN) 5 MG tablet         ALLERGIES:  Allergies   Allergen Reactions     Apixaban Other (See Comments)     Other reaction(s): menorrhagia  Menorrhagia       Morphine (Pf) [Morphine] Other (See Comments)     Heart rate slows and resp slow     Prednisone Palpitations       FAMILY HISTORY:  Family History   Problem Relation Age of Onset     Diabetes Father      CABG Father 50.00     Atrial fibrillation Father      Sleep Apnea Father      Skin Cancer Father      Diabetes Maternal Aunt      No Known Problems Mother      No Known Problems Son        SOCIAL HISTORY:   Social History     Socioeconomic History     Marital status:      Spouse name: Not on file     Number of children: 2     Years of education: Not on file     Highest education level: Not on file   Occupational History     Not on file   Tobacco Use     Smoking status: Former Smoker     Types: Cigarettes, Cigarettes     Smokeless tobacco: Never Used   Substance and Sexual Activity     Alcohol use: No     Drug use: No     Sexual activity: Yes     Partners: Male     Birth control/protection: Implant   Other Topics Concern     Not on file   Social History Narrative     Not on file     Social Determinants of Health     Financial Resource Strain: Not on file   Food Insecurity: Not on file   Transportation Needs: Not on file   Physical Activity: Not on file   Stress: Not on file   Social  Connections: Not on file   Intimate Partner Violence: Not on file   Housing Stability: Not on file       VITALS:  BP (!) 148/50   Pulse (!) 135   Resp 23   Wt 140 kg (308 lb 10.3 oz)   SpO2 96%   BMI 45.58 kg/m      PHYSICAL EXAM    Physical Exam  Constitutional:       General: She is not in acute distress.     Appearance: She is not diaphoretic.   HENT:      Head: Atraumatic.      Mouth/Throat:      Pharynx: No oropharyngeal exudate.   Eyes:      General: No scleral icterus.     Pupils: Pupils are equal, round, and reactive to light.   Cardiovascular:      Rate and Rhythm: Tachycardia present. Rhythm irregular.      Heart sounds: Normal heart sounds.   Pulmonary:      Effort: No respiratory distress.      Breath sounds: Normal breath sounds.   Abdominal:      Palpations: Abdomen is soft.      Tenderness: There is no abdominal tenderness. There is no guarding or rebound.   Musculoskeletal:         General: No tenderness.   Skin:     General: Skin is warm.      Findings: No rash.   Neurological:      General: No focal deficit present.      Mental Status: She is alert.           LAB:  All pertinent labs reviewed and interpreted.  Labs Ordered and Resulted from Time of ED Arrival to Time of ED Departure   BASIC METABOLIC PANEL - Abnormal       Result Value    Sodium 142      Potassium 3.7      Chloride 104      Carbon Dioxide (CO2) 23      Anion Gap 15      Urea Nitrogen 13      Creatinine 0.80      Calcium 10.3      Glucose 157 (*)     GFR Estimate >90     MAGNESIUM - Abnormal    Magnesium 1.7 (*)    TSH WITH FREE T4 REFLEX - Abnormal    TSH 6.69 (*)    INR - Abnormal    INR 1.72 (*)    CBC WITH PLATELETS AND DIFFERENTIAL - Abnormal    WBC Count 11.1 (*)     RBC Count 4.61      Hemoglobin 12.1      Hematocrit 38.4      MCV 83      MCH 26.2 (*)     MCHC 31.5      RDW 14.0      Platelet Count 307      % Neutrophils 51      % Lymphocytes 42      % Monocytes 6      % Eosinophils 1      % Basophils 0      % Immature  Granulocytes 0      NRBCs per 100 WBC 0      Absolute Neutrophils 5.5      Absolute Lymphocytes 4.7      Absolute Monocytes 0.7      Absolute Eosinophils 0.2      Absolute Basophils 0.0      Absolute Immature Granulocytes 0.0      Absolute NRBCs 0.0     TROPONIN I - Normal    Troponin I 0.01     T4 FREE   COVID-19 VIRUS (CORONAVIRUS) BY PCR       RADIOLOGY:  Reviewed all pertinent imaging. Please see official radiology report.  No orders to display       EKG:    Performed at: 121  Impression: SVT.  Some lateral ST depression when compared to previous dated September 8, 2019 now in SVT  SVT with a ventricular 8 of 212.  .  QTc 379    Performed at: 149  Impression: A. fib with RVR.  Nonspecific ST changes.  Compared to previous tonight A. fib has replaced SVT.  A. fib with a ventricular rate of 133.  QRS 78.  QTc 395    I have independently reviewed and interpreted the EKG(s) documented above.    PROCEDURES:     PROCEDURE: Electrical Cardioversion with Procedural Sedation   INDICATIONS: Sedation is required to allow for Cardioversion for Atrial Fibrilation    CARDIOVERSION TYPE: Biphasic, External   SEDATION PROVIDER: Dr Manuel Martin   CARDIOVERSION PROVIDER: Dr Manuel Martin   LEVEL OF SEDATION: Moderate Sedation    Defined as:  Minimal = Normal response to verbal  Moderate = Responds to verbal and light tactile stimulation  Deep = Responds after repeated painful stimulation   CONSENT: Risks, benefits and alternatives were discussed with and Written consent was obtained from Patient.   PROCEDURE SPECIFIC CHECKLIST COMPLETED: Yes   LAST ORAL INTAKE: Light Meal > 6 hours   ASA CLASS: 2 - Mild systemic disease   MALLAMPATI:  I - Faucial pillars, soft palate, and uvula are visible   TIME OUT: Universal protocol was followed. TIME OUT conducted just prior to starting procedure confirmed patient identity, site/side, procedure, patient position, and availability of correct equipment. Yes    Immediately prior to  initiation of sedation, reassessment of clinical condition was performed which was unchanged.   MEDICATIONS GIVEN: Etomidate, 15 mg, IV and Fentanyl, 50 mcg, IV    MONITORING: heart rate, cardiac monitor, continuous pulse oximeter, continuous capnometry (end tidal CO2), frequent blood pressure checks, level of consciousness checks, IV access, constant attendance by RN until patient is recovered and constant attendance by MD until patient is stable   RESPONSE: vital signs stable, airway patent and O2 saturations remained >92%   POST-SEDATION ASSESSMENT/PROCEDURE NOTE: CARDIOVERSION: Trial 1: Synchronized shock at 360 joules was Unsuccessful  Trial 2: Synchronized shock at 300 joules was Unsuccessful    SEDATION:  Lowest level oxygen saturation reached was 94%.    Post procedure patient was alert and responds to verbal stimuli    Patient was monitored during recovery and returned to pre-procedure baseline.   TOTAL MD DRUG ADMINISTRATION / MONITORING TIME: 20 minutes.   COMPLICATIONS: Patient tolerated procedure well, without complication         I, Dorothy Saucedo, am serving as a scribe to document services personally performed by Dr. Manuel Martin, based on my observation and the provider's statements to me. I, Manuel Martin MD attest that Dorothy Saucedo is acting in a scribe capacity, has observed my performance of the services and has documented them in accordance with my direction.    Manuel Martin M.D.  Emergency Medicine  [facility] EMERGENCY ROOM  [address] [facility] [id number] [phone]  Dept: [phone]     Manuel Martin MD  04/09/22 0511